# Patient Record
Sex: MALE | Race: WHITE | NOT HISPANIC OR LATINO | Employment: OTHER | URBAN - METROPOLITAN AREA
[De-identification: names, ages, dates, MRNs, and addresses within clinical notes are randomized per-mention and may not be internally consistent; named-entity substitution may affect disease eponyms.]

---

## 2023-10-02 ENCOUNTER — CLINICAL SUPPORT (OUTPATIENT)
Dept: CARDIAC REHAB | Facility: HOSPITAL | Age: 73
End: 2023-10-02
Payer: MEDICARE

## 2023-10-02 DIAGNOSIS — Z00.00 HEALTH CARE MAINTENANCE: ICD-10-CM

## 2023-10-02 PROCEDURE — 9430000001 HC PHASE III CARDIAC REHAB MONTHLY FEE

## 2023-10-03 ENCOUNTER — APPOINTMENT (OUTPATIENT)
Dept: CARDIAC REHAB | Facility: HOSPITAL | Age: 73
End: 2023-10-03
Payer: MEDICARE

## 2023-10-03 NOTE — PROGRESS NOTES
Cardiac Rehabilitation Initial Assessment (iITP)    Name: Garett Stafford  Medical Record Number: [id number]  YOB: 1950    Today’s Date: 10/3/2023  Primary Care Physician: Crys Bright DO  Referring Physician: ***    General  No diagnosis found.    AACVPR Risk Stratification:{AACVPR Risk Stratification:30153}    Medical History  No past medical history on file.  No past surgical history on file.  Allergies: Not on File    Psychosocial Assessment  Patient is returning for a follow up visit after reporting minimal to mild depressive symptoms.     Please document a new PHQ-9 score.    Pt reported/currently experiencing: {Yes/No:66127}  Currently seeing a mental health provider: {Yes/No:57642}  Social Support: {Yes/No:90048}    Learning assessment/barriers: {Assessment/barriers:57876}  Preferred learning method: {Preferred learning method:16019}   Quality of Life Survey:{Quality of Life Survey:94689}    Educational Assessment:  Cardiac Knowledge Test: ***/15    Stages of Change:{Stages of change:40002}    Psychosocial Goals: ***  Psychosocial Interventions/Education: ***        Nutrition Assessment:    Hyperlipidemia: {Yes/No:24577} ***    Lipids: ***  Lipid Draw Date: ***    Current Dietary Guidelines:{Dietary Guidelines:71030}  Barriers to dietary change: {Yes/No:02687} ***    Diet Habit Survey score: ***    Diabetes Assessment    Diabetes:{Yes or No:10382}   Medication: {Medication:40387}  Last Hemoglobin A1C: ***   Last Hemoglobin A1C date: ***  Pt monitors BS at home: {Yes/No:94051}  Frequency: ***  FBS range: ***  Hypoglycemic Episodes: {Yes/No:11883}    Weight Management:    There were no vitals filed for this visit.  There is no height or weight on file to calculate BMI.    Nutrition Goals: ***  Nutrition Interventions/Education: ***    Exercise Assessment:  Current Home Exercise: {Yes/No:54772}  Mode: ***  Days/Week: ***  Min/Day: ***    Exercise Prescription:    Based on: {{Exercise  Prescription:35373}   Frequency:  *** days/week   Mode: {Mode:65762}   Duration: *** total aerobic minutes   Intensity: RPE ***       Target HR ***       METS ***       SpO2 Range *** %       O2 Flow Rate *** L/min     Modality METS Load Duration   1 Pre-Exercise *** *** ***   2 Treadmill *** *** *** ***   3 Nustep 4000 *** *** *** ***   4 Recumbent Cycle *** *** *** ***   5 Weights *** *** *** ***   6 Post-Exercise *** *** ***     Exercise Goals: ***  Exercise Interventions/Education: ***    Other Core Components/Risk Factor Assessment:    Medication adherence:   Current Medications:   No current outpatient medications on file.     No current facility-administered medications for this visit.                 Taking medications as prescribed: {Yes/No:30088}   If no, why: ***   Uses pill box/organizer: {Yes/No:50818}   Carries medication list: {Yes/No:01145}    Blood Pressure Management:  Hx of Hypertension: {Yes/No:98788}  Resting BP: Growth %ile SmartLinks can only be used for patients less than 20 years old.     Heart Failure Management:  Hx of Heart Failure:{Yes/No:34272}  Type (selection): {Type (selection):16018}  Most recent EF: @LVEF%@  Date:***  Onset of heart failure diagnosis: ***  Last heart failure hospitalization: ***  Number of HF admissions per year: ***  Symptoms:{Symptoms:84207}  Is there a family Hx of HF:{Yes/No:86214}  Does patient obtain daily weight {Yes/No:73245}    Smoking/Tobacco Assessment:    Tobacco Use: Not on file       Other Core Component Goals: ***  Other Core Component Interventions/Education: ***       Individual Patient Goals:  ***      Rehaab Staff Signature: Glenys Ledesma RN  Electronic MD Review Signature: ***

## 2023-10-04 ENCOUNTER — APPOINTMENT (OUTPATIENT)
Dept: CARDIAC REHAB | Facility: HOSPITAL | Age: 73
End: 2023-10-04

## 2023-10-04 PROCEDURE — 9430000001 HC PHASE III CARDIAC REHAB MONTHLY FEE

## 2023-10-10 ENCOUNTER — APPOINTMENT (OUTPATIENT)
Dept: CARDIAC REHAB | Facility: HOSPITAL | Age: 73
End: 2023-10-10

## 2023-10-10 PROCEDURE — 9430000001 HC PHASE III CARDIAC REHAB MONTHLY FEE

## 2023-10-11 ENCOUNTER — CLINICAL SUPPORT (OUTPATIENT)
Dept: CARDIAC REHAB | Facility: HOSPITAL | Age: 73
End: 2023-10-11
Payer: COMMERCIAL

## 2023-10-11 PROCEDURE — 9430000001 HC PHASE III CARDIAC REHAB MONTHLY FEE

## 2023-10-12 ENCOUNTER — APPOINTMENT (OUTPATIENT)
Dept: CARDIAC REHAB | Facility: HOSPITAL | Age: 73
End: 2023-10-12
Payer: MEDICARE

## 2023-10-12 PROCEDURE — 9430000001 HC PHASE III CARDIAC REHAB MONTHLY FEE

## 2023-10-12 NOTE — PROGRESS NOTES
Cardiac Rehabilitation Initial Assessment (iITP)    Name: Garett Stafford  Medical Record Number: [id number]  YOB: 1950    Today’s Date: 10/12/2023  Primary Care Physician: Crys Bright DO  Referring Physician: ***    General  No diagnosis found.    AACVPR Risk Stratification:{AACVPR Risk Stratification:62126}    Medical History  No past medical history on file.  No past surgical history on file.  Allergies: Not on File    Psychosocial Assessment  Patient is returning for a follow up visit after reporting minimal to mild depressive symptoms.     Please document a new PHQ-9 score.    Pt reported/currently experiencing: {Yes/No:48543}  Currently seeing a mental health provider: {Yes/No:60094}  Social Support: {Yes/No:22564}    Learning assessment/barriers: {Assessment/barriers:25657}  Preferred learning method: {Preferred learning method:19456}   Quality of Life Survey:{Quality of Life Survey:72043}    Educational Assessment:  Cardiac Knowledge Test: ***/15    Stages of Change:{Stages of change:10189}    Psychosocial Goals: ***  Psychosocial Interventions/Education: ***        Nutrition Assessment:    Hyperlipidemia: {Yes/No:88664} ***    Lipids: ***  Lipid Draw Date: ***    Current Dietary Guidelines:{Dietary Guidelines:29153}  Barriers to dietary change: {Yes/No:69323} ***    Diet Habit Survey score: ***    Diabetes Assessment    Diabetes:{Yes or No:14536}   Medication: {Medication:12001}  Last Hemoglobin A1C: ***   Last Hemoglobin A1C date: ***  Pt monitors BS at home: {Yes/No:23489}  Frequency: ***  FBS range: ***  Hypoglycemic Episodes: {Yes/No:88421}    Weight Management:    There were no vitals filed for this visit.  There is no height or weight on file to calculate BMI.    Nutrition Goals: ***  Nutrition Interventions/Education: ***    Exercise Assessment:  Current Home Exercise: {Yes/No:71256}  Mode: ***  Days/Week: ***  Min/Day: ***    Exercise Prescription:    Based on: {{Exercise  Prescription:97912}   Frequency:  *** days/week   Mode: {Mode:80905}   Duration: *** total aerobic minutes   Intensity: RPE ***       Target HR ***       METS ***       SpO2 Range *** %       O2 Flow Rate *** L/min     Modality METS Load Duration   1 Pre-Exercise *** *** ***   2 Treadmill *** *** *** ***   3 Nustep 4000 *** *** *** ***   4 Recumbent Cycle *** *** *** ***   5 Weights *** *** *** ***   6 Post-Exercise *** *** ***     Exercise Goals: ***  Exercise Interventions/Education: ***    Other Core Components/Risk Factor Assessment:    Medication adherence:   Current Medications:   No current outpatient medications on file.     No current facility-administered medications for this visit.                 Taking medications as prescribed: {Yes/No:35166}   If no, why: ***   Uses pill box/organizer: {Yes/No:58259}   Carries medication list: {Yes/No:58263}    Blood Pressure Management:  Hx of Hypertension: {Yes/No:57461}  Resting BP: Growth %ile SmartLinks can only be used for patients less than 20 years old.     Heart Failure Management:  Hx of Heart Failure:{Yes/No:09867}  Type (selection): {Type (selection):06888}  Most recent EF: @LVEF%@  Date:***  Onset of heart failure diagnosis: ***  Last heart failure hospitalization: ***  Number of HF admissions per year: ***  Symptoms:{Symptoms:95234}  Is there a family Hx of HF:{Yes/No:41862}  Does patient obtain daily weight {Yes/No:42781}    Smoking/Tobacco Assessment:    Tobacco Use: Not on file       Other Core Component Goals: ***  Other Core Component Interventions/Education: ***       Individual Patient Goals:  ***      Rehaab Staff Signature: Jodie Gómez RN  Electronic MD Review Signature: ***

## 2023-11-02 ENCOUNTER — APPOINTMENT (OUTPATIENT)
Dept: CARDIAC REHAB | Facility: HOSPITAL | Age: 73
End: 2023-11-02

## 2023-11-02 DIAGNOSIS — I25.10 CAD IN NATIVE ARTERY: ICD-10-CM

## 2023-11-02 PROCEDURE — 9430000001 HC PHASE III CARDIAC REHAB MONTHLY FEE

## 2023-12-04 ENCOUNTER — APPOINTMENT (OUTPATIENT)
Dept: CARDIAC REHAB | Facility: HOSPITAL | Age: 73
End: 2023-12-04

## 2023-12-04 PROCEDURE — 9430000001 HC PHASE III CARDIAC REHAB MONTHLY FEE

## 2024-01-02 ENCOUNTER — APPOINTMENT (OUTPATIENT)
Dept: CARDIAC REHAB | Facility: HOSPITAL | Age: 74
End: 2024-01-02

## 2024-01-02 PROCEDURE — 9430000001 HC PHASE III CARDIAC REHAB MONTHLY FEE

## 2024-02-01 ENCOUNTER — APPOINTMENT (OUTPATIENT)
Dept: CARDIAC REHAB | Facility: HOSPITAL | Age: 74
End: 2024-02-01

## 2024-02-01 PROCEDURE — 9430000001 HC PHASE III CARDIAC REHAB MONTHLY FEE

## 2024-02-15 ENCOUNTER — APPOINTMENT (OUTPATIENT)
Dept: CARDIAC REHAB | Facility: HOSPITAL | Age: 74
End: 2024-02-15

## 2024-02-15 PROCEDURE — 9430000001 HC PHASE III CARDIAC REHAB MONTHLY FEE

## 2024-03-05 ENCOUNTER — APPOINTMENT (OUTPATIENT)
Dept: CARDIAC REHAB | Facility: HOSPITAL | Age: 74
End: 2024-03-05

## 2024-03-05 PROCEDURE — 9430000001 HC PHASE III CARDIAC REHAB MONTHLY FEE

## 2024-04-02 ENCOUNTER — APPOINTMENT (OUTPATIENT)
Dept: CARDIAC REHAB | Facility: HOSPITAL | Age: 74
End: 2024-04-02

## 2024-04-02 PROCEDURE — 9430000001 HC PHASE III CARDIAC REHAB MONTHLY FEE

## 2024-04-19 ENCOUNTER — APPOINTMENT (OUTPATIENT)
Dept: RADIOLOGY | Facility: HOSPITAL | Age: 74
End: 2024-04-19
Payer: OTHER GOVERNMENT

## 2024-04-19 ENCOUNTER — HOSPITAL ENCOUNTER (EMERGENCY)
Facility: HOSPITAL | Age: 74
Discharge: HOME | End: 2024-04-19
Attending: EMERGENCY MEDICINE
Payer: OTHER GOVERNMENT

## 2024-04-19 VITALS
SYSTOLIC BLOOD PRESSURE: 135 MMHG | TEMPERATURE: 98 F | BODY MASS INDEX: 36.51 KG/M2 | RESPIRATION RATE: 18 BRPM | DIASTOLIC BLOOD PRESSURE: 86 MMHG | HEIGHT: 70 IN | HEART RATE: 60 BPM | OXYGEN SATURATION: 97 % | WEIGHT: 255 LBS

## 2024-04-19 DIAGNOSIS — I86.1 LEFT VARICOCELE: ICD-10-CM

## 2024-04-19 DIAGNOSIS — M54.32 SCIATICA OF LEFT SIDE: Primary | ICD-10-CM

## 2024-04-19 LAB
ALBUMIN SERPL BCP-MCNC: 4.2 G/DL (ref 3.4–5)
ALP SERPL-CCNC: 64 U/L (ref 33–136)
ALT SERPL W P-5'-P-CCNC: 12 U/L (ref 10–52)
ANION GAP SERPL CALC-SCNC: 12 MMOL/L (ref 10–20)
APPEARANCE UR: ABNORMAL
AST SERPL W P-5'-P-CCNC: 17 U/L (ref 9–39)
BASOPHILS # BLD AUTO: 0.02 X10*3/UL (ref 0–0.1)
BASOPHILS NFR BLD AUTO: 0.3 %
BILIRUB SERPL-MCNC: 0.3 MG/DL (ref 0–1.2)
BILIRUB UR STRIP.AUTO-MCNC: NEGATIVE MG/DL
BUN SERPL-MCNC: 21 MG/DL (ref 6–23)
CALCIUM SERPL-MCNC: 9.6 MG/DL (ref 8.6–10.3)
CHLORIDE SERPL-SCNC: 102 MMOL/L (ref 98–107)
CO2 SERPL-SCNC: 30 MMOL/L (ref 21–32)
COLOR UR: YELLOW
CREAT SERPL-MCNC: 1.12 MG/DL (ref 0.5–1.3)
EGFRCR SERPLBLD CKD-EPI 2021: 68 ML/MIN/1.73M*2
EOSINOPHIL # BLD AUTO: 0.14 X10*3/UL (ref 0–0.4)
EOSINOPHIL NFR BLD AUTO: 2.3 %
ERYTHROCYTE [DISTWIDTH] IN BLOOD BY AUTOMATED COUNT: 13.2 % (ref 11.5–14.5)
GLUCOSE SERPL-MCNC: 93 MG/DL (ref 74–99)
GLUCOSE UR STRIP.AUTO-MCNC: NEGATIVE MG/DL
HCT VFR BLD AUTO: 41.7 % (ref 41–52)
HGB BLD-MCNC: 14.3 G/DL (ref 13.5–17.5)
IMM GRANULOCYTES # BLD AUTO: 0.01 X10*3/UL (ref 0–0.5)
IMM GRANULOCYTES NFR BLD AUTO: 0.2 % (ref 0–0.9)
KETONES UR STRIP.AUTO-MCNC: NEGATIVE MG/DL
LACTATE SERPL-SCNC: 0.8 MMOL/L (ref 0.4–2)
LEUKOCYTE ESTERASE UR QL STRIP.AUTO: NEGATIVE
LYMPHOCYTES # BLD AUTO: 2.1 X10*3/UL (ref 0.8–3)
LYMPHOCYTES NFR BLD AUTO: 34.2 %
MCH RBC QN AUTO: 32.6 PG (ref 26–34)
MCHC RBC AUTO-ENTMCNC: 34.3 G/DL (ref 32–36)
MCV RBC AUTO: 95 FL (ref 80–100)
MONOCYTES # BLD AUTO: 0.6 X10*3/UL (ref 0.05–0.8)
MONOCYTES NFR BLD AUTO: 9.8 %
NEUTROPHILS # BLD AUTO: 3.27 X10*3/UL (ref 1.6–5.5)
NEUTROPHILS NFR BLD AUTO: 53.2 %
NITRITE UR QL STRIP.AUTO: NEGATIVE
NRBC BLD-RTO: 0 /100 WBCS (ref 0–0)
PH UR STRIP.AUTO: 6 [PH]
PLATELET # BLD AUTO: 174 X10*3/UL (ref 150–450)
POTASSIUM SERPL-SCNC: 4.1 MMOL/L (ref 3.5–5.3)
PROT SERPL-MCNC: 7 G/DL (ref 6.4–8.2)
PROT UR STRIP.AUTO-MCNC: NEGATIVE MG/DL
RBC # BLD AUTO: 4.38 X10*6/UL (ref 4.5–5.9)
RBC # UR STRIP.AUTO: NEGATIVE /UL
SODIUM SERPL-SCNC: 140 MMOL/L (ref 136–145)
SP GR UR STRIP.AUTO: 1.02
UROBILINOGEN UR STRIP.AUTO-MCNC: <2 MG/DL
WBC # BLD AUTO: 6.1 X10*3/UL (ref 4.4–11.3)

## 2024-04-19 PROCEDURE — 81003 URINALYSIS AUTO W/O SCOPE: CPT | Performed by: PHYSICIAN ASSISTANT

## 2024-04-19 PROCEDURE — 71275 CT ANGIOGRAPHY CHEST: CPT | Performed by: STUDENT IN AN ORGANIZED HEALTH CARE EDUCATION/TRAINING PROGRAM

## 2024-04-19 PROCEDURE — 71275 CT ANGIOGRAPHY CHEST: CPT

## 2024-04-19 PROCEDURE — 72131 CT LUMBAR SPINE W/O DYE: CPT | Mod: RSC

## 2024-04-19 PROCEDURE — 85025 COMPLETE CBC W/AUTO DIFF WBC: CPT | Performed by: PHYSICIAN ASSISTANT

## 2024-04-19 PROCEDURE — 76870 US EXAM SCROTUM: CPT | Mod: FOREIGN READ | Performed by: RADIOLOGY

## 2024-04-19 PROCEDURE — 2500000001 HC RX 250 WO HCPCS SELF ADMINISTERED DRUGS (ALT 637 FOR MEDICARE OP): Performed by: PHYSICIAN ASSISTANT

## 2024-04-19 PROCEDURE — 2550000001 HC RX 255 CONTRASTS: Performed by: EMERGENCY MEDICINE

## 2024-04-19 PROCEDURE — 83605 ASSAY OF LACTIC ACID: CPT | Performed by: PHYSICIAN ASSISTANT

## 2024-04-19 PROCEDURE — 72131 CT LUMBAR SPINE W/O DYE: CPT | Mod: RSC | Performed by: RADIOLOGY

## 2024-04-19 PROCEDURE — 99285 EMERGENCY DEPT VISIT HI MDM: CPT | Mod: 25

## 2024-04-19 PROCEDURE — 36415 COLL VENOUS BLD VENIPUNCTURE: CPT | Performed by: PHYSICIAN ASSISTANT

## 2024-04-19 PROCEDURE — 80053 COMPREHEN METABOLIC PANEL: CPT | Performed by: PHYSICIAN ASSISTANT

## 2024-04-19 PROCEDURE — 74174 CTA ABD&PLVS W/CONTRAST: CPT | Performed by: STUDENT IN AN ORGANIZED HEALTH CARE EDUCATION/TRAINING PROGRAM

## 2024-04-19 PROCEDURE — 76870 US EXAM SCROTUM: CPT

## 2024-04-19 RX ORDER — ACETAMINOPHEN AND CODEINE PHOSPHATE 300; 30 MG/1; MG/1
1 TABLET ORAL ONCE
Status: COMPLETED | OUTPATIENT
Start: 2024-04-19 | End: 2024-04-19

## 2024-04-19 RX ORDER — ACETAMINOPHEN 500 MG
1000 TABLET ORAL EVERY 6 HOURS PRN
Qty: 56 TABLET | Refills: 0 | Status: SHIPPED | OUTPATIENT
Start: 2024-04-19 | End: 2024-04-26

## 2024-04-19 RX ORDER — METHOCARBAMOL 500 MG/1
500 TABLET, FILM COATED ORAL 2 TIMES DAILY
Qty: 20 TABLET | Refills: 0 | Status: SHIPPED | OUTPATIENT
Start: 2024-04-19 | End: 2024-04-29

## 2024-04-19 RX ORDER — LIDOCAINE 50 MG/G
1 PATCH TOPICAL DAILY
Qty: 10 PATCH | Refills: 0 | Status: SHIPPED | OUTPATIENT
Start: 2024-04-19 | End: 2024-04-29

## 2024-04-19 RX ADMIN — ACETAMINOPHEN AND CODEINE PHOSPHATE 1 TABLET: 300; 30 TABLET ORAL at 15:54

## 2024-04-19 RX ADMIN — IOHEXOL 100 ML: 350 INJECTION, SOLUTION INTRAVENOUS at 18:52

## 2024-04-19 ASSESSMENT — PAIN - FUNCTIONAL ASSESSMENT
PAIN_FUNCTIONAL_ASSESSMENT: 0-10
PAIN_FUNCTIONAL_ASSESSMENT: 0-10

## 2024-04-19 ASSESSMENT — PAIN SCALES - GENERAL
PAINLEVEL_OUTOF10: 8
PAINLEVEL_OUTOF10: 5 - MODERATE PAIN
PAINLEVEL_OUTOF10: 5 - MODERATE PAIN
PAINLEVEL_OUTOF10: 6

## 2024-04-19 ASSESSMENT — COLUMBIA-SUICIDE SEVERITY RATING SCALE - C-SSRS
6. HAVE YOU EVER DONE ANYTHING, STARTED TO DO ANYTHING, OR PREPARED TO DO ANYTHING TO END YOUR LIFE?: NO
1. IN THE PAST MONTH, HAVE YOU WISHED YOU WERE DEAD OR WISHED YOU COULD GO TO SLEEP AND NOT WAKE UP?: NO
2. HAVE YOU ACTUALLY HAD ANY THOUGHTS OF KILLING YOURSELF?: NO

## 2024-04-19 ASSESSMENT — PAIN DESCRIPTION - ORIENTATION: ORIENTATION: LOWER;LEFT

## 2024-04-19 ASSESSMENT — PAIN DESCRIPTION - FREQUENCY: FREQUENCY: CONSTANT/CONTINUOUS

## 2024-04-19 ASSESSMENT — PAIN DESCRIPTION - DESCRIPTORS: DESCRIPTORS: SHARP

## 2024-04-19 ASSESSMENT — PAIN DESCRIPTION - DIRECTION: RADIATING_TOWARDS: LEFT TESTICLE

## 2024-04-19 ASSESSMENT — PAIN DESCRIPTION - LOCATION: LOCATION: BACK

## 2024-04-19 ASSESSMENT — PAIN DESCRIPTION - PAIN TYPE: TYPE: ACUTE PAIN

## 2024-04-19 NOTE — ED PROVIDER NOTES
EMERGENCY MEDICINE EVALUATION NOTE    History of Present Illness     Chief Complaint:   Chief Complaint   Patient presents with    Back Pain     Pt reports lower back pain that radiates towards his left testicle. Pt reports the pain started yesterday morning and has progressed    Groin Pain       HPI: Garett Stafford is a 74 y.o. male presents with a chief complaint of multiple medical complaints.  Patient reports that his pain in his left lower back radiates to the left testicle.  Patient states that he is not sure that radiates directly to the testicle or if both just hurt.  Patient reports that he has no known injury to the area.  He states that he has no urinary symptoms other than slight frequency but denies any dysuria.  Patient says he believes he might of strained his back but with the pain in his testicle concerned him.  He denies any loss of bowel or bladder function.  Patient denies any weakness or numbness to the extremities but states that movement does cause the pain worse in his lower back.  Patient reports that he was concerned because he felt like there was a bulge in his scrotal area.  Patient denies use of any anticoagulation.  Patient denies any fevers or chills.  Towards the end of the interview on the way into the room patient did mention that he has a history of an aneurysm in his aorta when he believes it is lower abdomen.  Patient is unsure of last evaluation.     Previous History   No past medical history on file.  No past surgical history on file.     No family history on file.  Allergies   Allergen Reactions    Colestipol Other     Unable to move left arm    Demerol [Meperidine] Other     tachycardia    Lipitor [Atorvastatin] Other     Unable  to use left arm    Simvastatin Other     Unable to use left arm    Augmentin [Amoxicillin-Pot Clavulanate] Itching and Rash    Adhes. Band-Tape-Benzalkonium Other     Causes redness      Current Outpatient Medications   Medication Instructions     acetaminophen (TYLENOL) 1,000 mg, oral, Every 6 hours PRN    lidocaine (Lidoderm) 5 % patch 1 patch, transdermal, Daily, Remove & discard patch within 12 hours or as directed by MD.    methocarbamol (ROBAXIN) 500 mg, oral, 2 times daily       Physical Exam     Appearance: Alert, oriented , cooperative     Skin: Intact,  dry skin, no lesions, rash, petechiae or purpura.      Eyes: PERRLA, EOMs intact,  Conjunctiva pink      ENT: Hearing grossly intact. Pharynx clear     Neck: Supple. Trachea at midline.      Pulmonary: Clear bilaterally. No rales, rhonchi or wheezing. No accessory muscle use or stridor.     Cardiac: Normal rate and rhythm without murmur     Abdomen: Soft, nontender, active bowel sounds.    : No obvious abnormal  exam.  Patient has no obvious bulge palpated in left scrotal area.  No tenderness over the left testicle.  Patient has no inguinal hernia palpated.     Musculoskeletal: Full range of motion.  No midline C, T, L-spine tenderness.  Diffuse left-sided lumbar paraspinal tenderness.  Neuro vas intact in bilateral lower extremities.  Equal questionable bilateral lower extremities.     Neurological:Cranial nerves II through XII are grossly intact, normal sensation, no weakness, no focal findings identified.     Results     Labs Reviewed   CBC WITH AUTO DIFFERENTIAL - Abnormal       Result Value    WBC 6.1      nRBC 0.0      RBC 4.38 (*)     Hemoglobin 14.3      Hematocrit 41.7      MCV 95      MCH 32.6      MCHC 34.3      RDW 13.2      Platelets 174      Neutrophils % 53.2      Immature Granulocytes %, Automated 0.2      Lymphocytes % 34.2      Monocytes % 9.8      Eosinophils % 2.3      Basophils % 0.3      Neutrophils Absolute 3.27      Immature Granulocytes Absolute, Automated 0.01      Lymphocytes Absolute 2.10      Monocytes Absolute 0.60      Eosinophils Absolute 0.14      Basophils Absolute 0.02     URINALYSIS WITH REFLEX CULTURE AND MICROSCOPIC - Abnormal    Color, Urine Yellow       Appearance, Urine Hazy (*)     Specific Gravity, Urine 1.016      pH, Urine 6.0      Protein, Urine NEGATIVE      Glucose, Urine NEGATIVE      Blood, Urine NEGATIVE      Ketones, Urine NEGATIVE      Bilirubin, Urine NEGATIVE      Urobilinogen, Urine <2.0      Nitrite, Urine NEGATIVE      Leukocyte Esterase, Urine NEGATIVE     COMPREHENSIVE METABOLIC PANEL - Normal    Glucose 93      Sodium 140      Potassium 4.1      Chloride 102      Bicarbonate 30      Anion Gap 12      Urea Nitrogen 21      Creatinine 1.12      eGFR 68      Calcium 9.6      Albumin 4.2      Alkaline Phosphatase 64      Total Protein 7.0      AST 17      Bilirubin, Total 0.3      ALT 12     LACTATE - Normal    Lactate 0.8      Narrative:     Venipuncture immediately after or during the administration of Metamizole may lead to falsely low results. Testing should be performed immediately  prior to Metamizole dosing.   URINALYSIS WITH REFLEX CULTURE AND MICROSCOPIC    Narrative:     The following orders were created for panel order Urinalysis with Reflex Culture and Microscopic.  Procedure                               Abnormality         Status                     ---------                               -----------         ------                     Urinalysis with Reflex C...[[id number]]  Abnormal            Final result               Extra Urine Gray Tube[[id number]]                            In process                   Please view results for these tests on the individual orders.   EXTRA URINE GRAY TUBE     CT angio chest abdomen pelvis   Final Result   1. Stable 4.8 x 4.2 cm infrarenal abdominal aortic aneurysm with   slight interval increase in the degree of noncalcified mural thrombus   about the aneurysm. No evidence of dissection. Mild-to-moderate   diffuse atherosclerotic calcification.   2. No acute abnormality of the chest, abdomen or pelvis.   3. Other findings as described above.                  Signed by: Stevan Pena 4/19/2024  "7:26 PM   Dictation workstation:   [id number]      CT lumbar spine wo IV contrast   Final Result   1.Multilevel degenerative disc and degenerative facet disease   with multilevel spondylosis.  Progressive displaced disc disease most   marked at L2-3.   2.Fusiform aneurysm of the infrarenal aorta measuring 5.1 x 4.4   cm, previously 4.8 x 4.2 cm., recommend consideration of vascular   consultation.   Signed by Nancy Henry DO      US scrotum   Final Result   Left-sided varicocele measuring 0.3 cm. Bilateral hydroceles with   debris, greater on the left.   Normal testicular spectral Doppler evaluation.   No significant change compared to prior ultrasound from September 2017.   Signed by Reymundo Vazquez MD            ED Course & Medical Decision Making     Medications   acetaminophen-codeine (Tylenol w/ Codeine #3) 300-30 mg per tablet 1 tablet (1 tablet oral Given 4/19/24 1554)   iohexol (OMNIPaque) 350 mg iodine/mL solution 100 mL (100 mL intravenous Given 4/19/24 1852)     Heart Rate:  [60-62]   Temperature:  [36.7 °C (98 °F)-36.7 °C (98.1 °F)]   Respirations:  [18]   BP: (120-136)/(78-86)   Height:  [177.8 cm (5' 10\")]   Weight:  [116 kg (255 lb)]   Pulse Ox:  [97 %]    Diagnoses as of 04/20/24 0041   Sciatica of left side   Left varicocele     74-year male presents today with a multitude of complaints that involve the left lower back and left groin pain.  Patient reported he had felt an abnormality in his left testicular area but also pain rating to the left flank to that area.  He states that he had some urinary frequency but no dysuria.  Patient denies any associated chest pain or shortness of breath.  Patient does report that he also has a history of an aneurysm in his lower aorta.  Patient did have a workup which included blood work which did not show any significant abnormality.  Patient had CT angio of the chest abdomen pelvis which showed persistent aneurysm but no significant dilatation.  Patient had " ultrasound of the testicles which did show a left-sided varicocele.  CT of the spine did show bulging disks consistent with sciatica and pain of left lower extremity.  At this time patient had no loss of bowel or bladder function and no other red flag signs or symptoms of be concerning for cauda equina so I do not think patient needs MRI for further evaluation.  Patient is in agreement with this plan of care.  Patient updated on results by attending physician and will be discharged home at this time.    Procedures   Procedures    Diagnosis     1. Sciatica of left side    2. Left varicocele        Disposition   Discharged    ED Prescriptions       Medication Sig Dispense Start Date End Date Auth. Provider    methocarbamol (Robaxin) 500 mg tablet Take 1 tablet (500 mg) by mouth 2 times a day for 10 days. 20 tablet 4/19/2024 4/29/2024 Bj Ronquillo MD    acetaminophen (Tylenol) 500 mg tablet Take 2 tablets (1,000 mg) by mouth every 6 hours if needed for moderate pain (4 - 6) for up to 7 days. 56 tablet 4/19/2024 4/26/2024 Bj Ronquillo MD    lidocaine (Lidoderm) 5 % patch Place 1 patch over 12 hours on the skin once daily for 10 days. Remove & discard patch within 12 hours or as directed by MD. 10 patch 4/19/2024 4/29/2024 Bj Ronquillo MD            Disclaimer: This note was dictated by speech recognition. Minor errors in transcription may be present. Please call if questions.       Gage Sesay PA-C  04/20/24 0043

## 2024-04-20 LAB — HOLD SPECIMEN: 293

## 2024-05-08 ENCOUNTER — APPOINTMENT (OUTPATIENT)
Dept: RADIOLOGY | Facility: HOSPITAL | Age: 74
End: 2024-05-08
Payer: OTHER GOVERNMENT

## 2024-05-08 ENCOUNTER — HOSPITAL ENCOUNTER (EMERGENCY)
Facility: HOSPITAL | Age: 74
Discharge: HOME | End: 2024-05-08
Payer: OTHER GOVERNMENT

## 2024-05-08 VITALS
WEIGHT: 255 LBS | HEART RATE: 67 BPM | TEMPERATURE: 97.9 F | DIASTOLIC BLOOD PRESSURE: 79 MMHG | OXYGEN SATURATION: 96 % | BODY MASS INDEX: 36.51 KG/M2 | HEIGHT: 70 IN | RESPIRATION RATE: 18 BRPM | SYSTOLIC BLOOD PRESSURE: 135 MMHG
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
## 2024-05-08 DIAGNOSIS — S80.861A INSECT BITE OF RIGHT LOWER LEG, INITIAL ENCOUNTER: Primary | ICD-10-CM

## 2024-05-08 DIAGNOSIS — L81.9 DISCOLORATION OF SKIN OF MULTIPLE SITES OF LOWER EXTREMITY: ICD-10-CM

## 2024-05-08 LAB
ANION GAP SERPL CALC-SCNC: 15 MMOL/L (ref 10–20)
APTT PPP: 22 SECONDS (ref 27–38)
BASOPHILS # BLD AUTO: 0.02 X10*3/UL (ref 0–0.1)
BASOPHILS NFR BLD AUTO: 0.3 %
BUN SERPL-MCNC: 25 MG/DL (ref 6–23)
CALCIUM SERPL-MCNC: 9.9 MG/DL (ref 8.6–10.3)
CHLORIDE SERPL-SCNC: 105 MMOL/L (ref 98–107)
CO2 SERPL-SCNC: 23 MMOL/L (ref 21–32)
CREAT SERPL-MCNC: 1.15 MG/DL (ref 0.5–1.3)
EGFRCR SERPLBLD CKD-EPI 2021: 66 ML/MIN/1.73M*2
EOSINOPHIL # BLD AUTO: 0.14 X10*3/UL (ref 0–0.4)
EOSINOPHIL NFR BLD AUTO: 2.2 %
ERYTHROCYTE [DISTWIDTH] IN BLOOD BY AUTOMATED COUNT: 13.2 % (ref 11.5–14.5)
GLUCOSE SERPL-MCNC: 92 MG/DL (ref 74–99)
HCT VFR BLD AUTO: 44.2 % (ref 41–52)
HGB BLD-MCNC: 15 G/DL (ref 13.5–17.5)
IMM GRANULOCYTES # BLD AUTO: 0.02 X10*3/UL (ref 0–0.5)
IMM GRANULOCYTES NFR BLD AUTO: 0.3 % (ref 0–0.9)
INR PPP: 1 (ref 0.9–1.1)
LYMPHOCYTES # BLD AUTO: 2.29 X10*3/UL (ref 0.8–3)
LYMPHOCYTES NFR BLD AUTO: 36.2 %
MCH RBC QN AUTO: 32 PG (ref 26–34)
MCHC RBC AUTO-ENTMCNC: 33.9 G/DL (ref 32–36)
MCV RBC AUTO: 94 FL (ref 80–100)
MONOCYTES # BLD AUTO: 0.57 X10*3/UL (ref 0.05–0.8)
MONOCYTES NFR BLD AUTO: 9 %
NEUTROPHILS # BLD AUTO: 3.28 X10*3/UL (ref 1.6–5.5)
NEUTROPHILS NFR BLD AUTO: 52 %
NRBC BLD-RTO: 0 /100 WBCS (ref 0–0)
PLATELET # BLD AUTO: 161 X10*3/UL (ref 150–450)
POTASSIUM SERPL-SCNC: 4.2 MMOL/L (ref 3.5–5.3)
PROTHROMBIN TIME: 11.3 SECONDS (ref 9.8–12.8)
RBC # BLD AUTO: 4.69 X10*6/UL (ref 4.5–5.9)
SODIUM SERPL-SCNC: 139 MMOL/L (ref 136–145)
WBC # BLD AUTO: 6.3 X10*3/UL (ref 4.4–11.3)

## 2024-05-08 PROCEDURE — 73564 X-RAY EXAM KNEE 4 OR MORE: CPT | Mod: RT

## 2024-05-08 PROCEDURE — 85730 THROMBOPLASTIN TIME PARTIAL: CPT | Performed by: NURSE PRACTITIONER

## 2024-05-08 PROCEDURE — 80048 BASIC METABOLIC PNL TOTAL CA: CPT | Performed by: NURSE PRACTITIONER

## 2024-05-08 PROCEDURE — 85610 PROTHROMBIN TIME: CPT | Performed by: NURSE PRACTITIONER

## 2024-05-08 PROCEDURE — 99283 EMERGENCY DEPT VISIT LOW MDM: CPT | Performed by: NURSE PRACTITIONER

## 2024-05-08 PROCEDURE — 36415 COLL VENOUS BLD VENIPUNCTURE: CPT | Performed by: NURSE PRACTITIONER

## 2024-05-08 PROCEDURE — 73564 X-RAY EXAM KNEE 4 OR MORE: CPT | Mod: RIGHT SIDE | Performed by: RADIOLOGY

## 2024-05-08 PROCEDURE — 85025 COMPLETE CBC W/AUTO DIFF WBC: CPT | Performed by: NURSE PRACTITIONER

## 2024-05-08 ASSESSMENT — LIFESTYLE VARIABLES
TOTAL SCORE: 0
HAVE YOU EVER FELT YOU SHOULD CUT DOWN ON YOUR DRINKING: NO
EVER FELT BAD OR GUILTY ABOUT YOUR DRINKING: NO
HAVE PEOPLE ANNOYED YOU BY CRITICIZING YOUR DRINKING: NO
EVER HAD A DRINK FIRST THING IN THE MORNING TO STEADY YOUR NERVES TO GET RID OF A HANGOVER: NO

## 2024-05-08 ASSESSMENT — PAIN SCALES - GENERAL: PAINLEVEL_OUTOF10: 0 - NO PAIN

## 2024-05-08 ASSESSMENT — VISUAL ACUITY: OU: 1

## 2024-05-08 ASSESSMENT — PAIN - FUNCTIONAL ASSESSMENT: PAIN_FUNCTIONAL_ASSESSMENT: 0-10

## 2024-05-08 NOTE — ED PROVIDER NOTES
HPI   Chief Complaint   Patient presents with    bruise to right knee area     bruise to right knee area. Says he did not hit his leg at all. Denies any recent falls. Is only on baby ASA daily.        Patient presents to the emergency department for evaluation of a bruise to the right knee area that he woke up with a day or so ago.  Patient states that there was some numbness to the area so he put body cream on it and that numbness improved.  He admits that he has had numbness to this area since after having a knee arthroplasty 2 years ago at [facility].  He does not have any known traumatic fall, bumping of the leg or knee pain or leg pain.  He was recently evaluated for sciatic, within the last month and he reports that pain is resolved with the use of Lidoderm patch.  He noticed today a bite near his ankle that he scratched due to being itchy and he has now a similar bruise on his right lower leg beneath the area of bruising by his knee.  He denies any fever, chills, myalgias or malaise.  There is no associated limited range of motion secondary to this.  He denies a history of blood clots, posterior leg pain or swelling.  He does not recall any kind of a insect bite or sting.  There is no associated chest pain, shortness of breath, hemoptysis, nausea, vomiting or abdominal pain.  He is having bowel movements and urinating as per typical without loss of bowel or bladder control or saddle paresthesia.  He is able to Perform ADLs and ambulate without difficulty. He has not taken any other over-the-counter intervention for his symptoms.  He reports some to be mild in severity and persistent nature.      History provided by:  Patient   used: No      INFORMED PHOTO CONSENT:    The patient has given verbal consent to have photos taken of right leg and inserted into their provider note as a part of their permanent medical record for purposes of documentation, treatment management, and/or medical  "review. All images taken were transmitted and stored on a secure Epic  Site located within a Media Folder Tab by a registered Epic-Haiku Mobile Application Device. See \"Media\" tab in Epic or photo as below.                    Javed Coma Scale Score: 15                     Patient History   History reviewed. No pertinent past medical history.  History reviewed. No pertinent surgical history.  No family history on file.  Social History     Tobacco Use    Smoking status: Not on file    Smokeless tobacco: Not on file   Substance Use Topics    Alcohol use: Not on file    Drug use: Not on file       Physical Exam   ED Triage Vitals [05/08/24 1307]   Temperature Heart Rate Respirations BP   36.6 °C (97.9 °F) 67 18 135/79      Pulse Ox Temp Source Heart Rate Source Patient Position   96 % Temporal Monitor Sitting      BP Location FiO2 (%)     Left arm --       Physical Exam  Vitals reviewed.   Constitutional:       Appearance: Normal appearance.      Comments: Patient seated in chair reading upon my entering room.   HENT:      Head: Normocephalic and atraumatic.      Right Ear: Hearing normal.      Left Ear: Hearing normal.      Nose: Nose normal.      Mouth/Throat:      Lips: Pink.      Mouth: Mucous membranes are moist.   Eyes:      General: Vision grossly intact.   Cardiovascular:      Rate and Rhythm: Normal rate and regular rhythm.      Pulses:           Dorsalis pedis pulses are 2+ on the right side and 2+ on the left side.        Posterior tibial pulses are 2+ on the right side and 2+ on the left side.      Comments: Pulses confirmed by doppler for patient to hear as well.  The right calf measures 48 cm and the left calf measures 49 cm.  No posterior leg tenderness bilaterally.  Pulmonary:      Effort: Pulmonary effort is normal.      Breath sounds: Normal breath sounds.   Musculoskeletal:      Cervical back: Normal range of motion and neck supple.      Right lower leg: No edema.      Left lower leg: No " edema.      Comments: There is no bony tenderness to the right hip, thigh, knee, tib-fib, ankle or foot on the right.  Patient does have petechial bruising to the knee and lower aspect of the tib-fib as depicted below.  There is 2 bite marks 1 at the proximal fibula and 1 at the distal tib-fib which are in the center of the skin discoloration as depicted below.  There is no foreign body or fluctuance consistent with abscess.  No lymphangitis or warmth.  Full range of motion of the knee, ankle and toes.  Compartments are all soft.  Neurovascularly intact with normal capillary refill.   Skin:     General: Skin is warm and dry.      Capillary Refill: Capillary refill takes less than 2 seconds.      Comments: See documentation above under musculoskeletal.    Neurological:      Mental Status: He is alert and oriented to person, place, and time.      Sensory: Sensation is intact.      Motor: Motor function is intact.      Coordination: Coordination is intact.      Gait: Gait is intact.      Comments: Ambulatory with a steady gait, no limp or assistive device.         ED Course & MDM   Diagnoses as of 05/08/24 1638   Insect bite of right lower leg, initial encounter   Discoloration of skin of multiple sites of lower extremity       Medical Decision Making  Presents to the emergency department for evaluation of bruising to his knee.  There was some associated numbness to the area however he admits he has had numbness to this area ever since his knee arthroplasty.  He was evaluated for sciatica within the last month however he denies this being connected to his back and his sciatica has resolved with the use of Lidoderm patches.  He does not have any loss of bowel or bladder control, urinary retention, saddle paresthesia, foot drop or leg weakness.  He on exam has central puncture and somewhat raised induration consistent with bite and patient admits that he has a bite distally to the area and a developing bruise consistent  with what he scratched due to itchiness.  This is likely what happened to the area around the knee and he potentially did this in his sleep in hindsight.  Will check his knee arthroplasty for thoroughness and for his peace of mind as well as baseline labs to ensure no thrombocytopenia, leukocytosis or need for further intervention.  Patient declines need for pain medication as he denies pain.    X-rays as interpreted by radiologist show no hardware complication or periprosthetic fracture.  There is trace knee effusion with no erythema or limited range of motion suggestive of septic joint and he has no leukocytosis or fever.  There is no significant electrolyte derangement, thrombocytopenia or prolonged PT/INR.    Plan is for topical steroid cream as needed, monitoring of symptoms and keep his dermatology appointment as scheduled next week.  He declines a prescription for antibiotic for watchful waiting should he develop a cellulitis. Patient is non-toxic, not hypoxic and appropriate for this outpatient management plan which they prefer. Encouragement to arrange close follow up was discussed as well as provided in a written handout of discharge instructions. Patient was educated on signs of symptoms to watch for indicative of re-evaluation in the emergency department setting to include any worsening of current symptoms. They verbalized understanding of instructions and is amenable to this treatment plan with no social determinants of health that would obscure this plan. Patient departed in stable condition.         Procedure  Procedures     JOAQUIN Woodard  05/08/24 1636       ODALIS Woodard-SANTINO  05/08/24 1638

## 2024-05-08 NOTE — DISCHARGE INSTRUCTIONS
"INSECT BITES AND STINGS PATIENT INSTRUCTIONS:    How are insect bites and stings different? -- When an insect bites you, it uses its mouth parts. When an insect stings you, it uses a special \"stinger\" on the back of its body.  Biting insects can transfer blood from other people and animals they've bitten to you. That means they can infect you with the diseases their other victims have. Mosquitoes and ticks, for example, can carry a few infections.    Stinging insects, such as bees, wasps, and fire ants, do not usually carry disease. But stinging insects can inject you with venom that can irritate your skin. Plus, insect stings can be deadly to people who are severely allergic to the insect venom.    What should I do if I am stung by a bee, wasp, or fire ant? -- If you are stung by a bee or wasp, quickly remove the stinger from your skin if it is still there. If you are stung by a fire ant, kill the ant with a slap as soon as you feel the sting.    Some people have a severe allergic reaction to insect stings called anaphylaxis. Call for an ambulance (in the [place] and [place], dial 9-1-1) if you suddenly:  ?Have trouble breathing, become hoarse, or start wheezing (hearing a whistling sound when you breathe)  ?Start to swell, especially around the face, eyelids, ears, mouth, hands, or feet  ?Develop belly cramps, nausea, vomiting, or diarrhea  ?Feel dizzy or pass out  What is a normal reaction to an insect sting? -- Insect stings can cause the area around the sting to swell, turn red, hurt, and feel hot.  To treat the pain and swelling around the area of the sting, you can:  ?Wash the area with soap and cool water  ?Keep the area clean and try not to scratch it  ?Put a cold, damp washcloth on the area  ?Take or apply anti-itch medicine  ?Take a nonprescription pain medicine for the pain    What should I know about tick bites? -- Ticks are found in the grass and on shrubs, and can attach to people walking by. One type of " tick can spread Lyme disease. But a tick has to stay attached for a while before it can give you the infection. If you are bitten by a tick, gently remove the tick from your skin, using tweezers. If you cannot remove a tick, see your doctor or nurse.    What can I do to reduce the chances of getting bitten or stung? -- You can:  ?Wear shoes, long-sleeved shirts, and long pants when you go outside. If you are worried about ticks, tuck your pants into your socks and wear light colors so you can spot any ticks that get on you.  ?Wear bug spray.  ?Stay inside at bhavana and dusk, when mosquitoes are most active.  ?Drain areas of standing water near your home, such as wading pools and buckets. Mosquitoes breed in standing water.  ?Keep foods and drinks covered when you are outside.  ?If you see a stinging insect, stay calm and slowly back away.  ?If you live in an area that has fire ants, avoid stepping on ant mounds.  ?If you find an insect nest in or near your house, call a pest-control service to get rid of the nest safely.    ©2017 [employer] and/or its affiliates. All Rights Reserved.

## 2024-06-04 ENCOUNTER — APPOINTMENT (OUTPATIENT)
Dept: CARDIAC REHAB | Facility: HOSPITAL | Age: 74
End: 2024-06-04

## 2024-06-04 PROCEDURE — 9430000001 HC PHASE III CARDIAC REHAB MONTHLY FEE

## 2024-07-01 ENCOUNTER — CLINICAL SUPPORT (OUTPATIENT)
Dept: CARDIAC REHAB | Facility: HOSPITAL | Age: 74
End: 2024-07-01

## 2024-07-01 PROCEDURE — 9430000001 HC PHASE III CARDIAC REHAB MONTHLY FEE

## 2024-08-01 ENCOUNTER — APPOINTMENT (OUTPATIENT)
Dept: CARDIAC REHAB | Facility: HOSPITAL | Age: 74
End: 2024-08-01

## 2024-08-01 PROCEDURE — 9430000001 HC PHASE III CARDIAC REHAB MONTHLY FEE

## 2024-09-03 ENCOUNTER — APPOINTMENT (OUTPATIENT)
Dept: CARDIAC REHAB | Facility: HOSPITAL | Age: 74
End: 2024-09-03

## 2024-09-03 PROCEDURE — 9430000001 HC PHASE III CARDIAC REHAB MONTHLY FEE

## 2024-10-03 ENCOUNTER — APPOINTMENT (OUTPATIENT)
Dept: CARDIAC REHAB | Facility: HOSPITAL | Age: 74
End: 2024-10-03

## 2024-10-03 PROCEDURE — 9430000001 HC PHASE III CARDIAC REHAB MONTHLY FEE

## 2024-10-30 ENCOUNTER — APPOINTMENT (OUTPATIENT)
Dept: RADIOLOGY | Facility: HOSPITAL | Age: 74
End: 2024-10-30
Payer: OTHER GOVERNMENT

## 2024-10-30 ENCOUNTER — HOSPITAL ENCOUNTER (EMERGENCY)
Facility: HOSPITAL | Age: 74
Discharge: HOME | End: 2024-10-30
Attending: EMERGENCY MEDICINE
Payer: OTHER GOVERNMENT

## 2024-10-30 ENCOUNTER — APPOINTMENT (OUTPATIENT)
Dept: CARDIOLOGY | Facility: HOSPITAL | Age: 74
End: 2024-10-30
Payer: OTHER GOVERNMENT

## 2024-10-30 VITALS
HEART RATE: 60 BPM | TEMPERATURE: 97 F | SYSTOLIC BLOOD PRESSURE: 168 MMHG | HEIGHT: 70 IN | OXYGEN SATURATION: 96 % | RESPIRATION RATE: 16 BRPM | BODY MASS INDEX: 37.51 KG/M2 | WEIGHT: 262 LBS | DIASTOLIC BLOOD PRESSURE: 90 MMHG

## 2024-10-30 DIAGNOSIS — R51.9 PRESSURE IN HEAD: ICD-10-CM

## 2024-10-30 DIAGNOSIS — M79.629 AXILLARY PAIN, UNSPECIFIED LATERALITY: Primary | ICD-10-CM

## 2024-10-30 LAB
ALBUMIN SERPL BCP-MCNC: 4.3 G/DL (ref 3.4–5)
ALP SERPL-CCNC: 66 U/L (ref 33–136)
ALT SERPL W P-5'-P-CCNC: 12 U/L (ref 10–52)
ANION GAP SERPL CALC-SCNC: 11 MMOL/L (ref 10–20)
APPEARANCE UR: CLEAR
AST SERPL W P-5'-P-CCNC: 16 U/L (ref 9–39)
ATRIAL RATE: 60 BPM
BASOPHILS # BLD AUTO: 0.02 X10*3/UL (ref 0–0.1)
BASOPHILS NFR BLD AUTO: 0.3 %
BILIRUB SERPL-MCNC: 0.5 MG/DL (ref 0–1.2)
BILIRUB UR STRIP.AUTO-MCNC: NEGATIVE MG/DL
BNP SERPL-MCNC: 23 PG/ML (ref 0–99)
BUN SERPL-MCNC: 20 MG/DL (ref 6–23)
CALCIUM SERPL-MCNC: 9.4 MG/DL (ref 8.6–10.3)
CARDIAC TROPONIN I PNL SERPL HS: 10 NG/L (ref 0–20)
CARDIAC TROPONIN I PNL SERPL HS: 9 NG/L (ref 0–20)
CHLORIDE SERPL-SCNC: 105 MMOL/L (ref 98–107)
CO2 SERPL-SCNC: 29 MMOL/L (ref 21–32)
COLOR UR: YELLOW
CREAT SERPL-MCNC: 1.23 MG/DL (ref 0.5–1.3)
EGFRCR SERPLBLD CKD-EPI 2021: 61 ML/MIN/1.73M*2
EOSINOPHIL # BLD AUTO: 0.15 X10*3/UL (ref 0–0.4)
EOSINOPHIL NFR BLD AUTO: 2.3 %
ERYTHROCYTE [DISTWIDTH] IN BLOOD BY AUTOMATED COUNT: 13.2 % (ref 11.5–14.5)
GLUCOSE SERPL-MCNC: 106 MG/DL (ref 74–99)
GLUCOSE UR STRIP.AUTO-MCNC: NORMAL MG/DL
HCT VFR BLD AUTO: 43.7 % (ref 41–52)
HGB BLD-MCNC: 14.4 G/DL (ref 13.5–17.5)
HOLD SPECIMEN: NORMAL
IMM GRANULOCYTES # BLD AUTO: 0.02 X10*3/UL (ref 0–0.5)
IMM GRANULOCYTES NFR BLD AUTO: 0.3 % (ref 0–0.9)
KETONES UR STRIP.AUTO-MCNC: NEGATIVE MG/DL
LEUKOCYTE ESTERASE UR QL STRIP.AUTO: NEGATIVE
LYMPHOCYTES # BLD AUTO: 2.21 X10*3/UL (ref 0.8–3)
LYMPHOCYTES NFR BLD AUTO: 33.9 %
MAGNESIUM SERPL-MCNC: 1.99 MG/DL (ref 1.6–2.4)
MCH RBC QN AUTO: 31.7 PG (ref 26–34)
MCHC RBC AUTO-ENTMCNC: 33 G/DL (ref 32–36)
MCV RBC AUTO: 96 FL (ref 80–100)
MONOCYTES # BLD AUTO: 0.58 X10*3/UL (ref 0.05–0.8)
MONOCYTES NFR BLD AUTO: 8.9 %
NEUTROPHILS # BLD AUTO: 3.54 X10*3/UL (ref 1.6–5.5)
NEUTROPHILS NFR BLD AUTO: 54.3 %
NITRITE UR QL STRIP.AUTO: NEGATIVE
NRBC BLD-RTO: 0 /100 WBCS (ref 0–0)
P AXIS: 37 DEGREES
PH UR STRIP.AUTO: 6.5 [PH]
PLATELET # BLD AUTO: 173 X10*3/UL (ref 150–450)
POTASSIUM SERPL-SCNC: 3.9 MMOL/L (ref 3.5–5.3)
PR INTERVAL: 182 MS
PROT SERPL-MCNC: 7 G/DL (ref 6.4–8.2)
PROT UR STRIP.AUTO-MCNC: NEGATIVE MG/DL
Q ONSET: 249 MS
QRS COUNT: 9 BEATS
QRS DURATION: 194 MS
QT INTERVAL: 488 MS
QTC CALCULATION(BAZETT): 488 MS
QTC FREDERICIA: 488 MS
R AXIS: -80 DEGREES
RBC # BLD AUTO: 4.54 X10*6/UL (ref 4.5–5.9)
RBC # UR STRIP.AUTO: NEGATIVE /UL
SODIUM SERPL-SCNC: 141 MMOL/L (ref 136–145)
SP GR UR STRIP.AUTO: 1.02
T AXIS: 61 DEGREES
T OFFSET: 493 MS
UROBILINOGEN UR STRIP.AUTO-MCNC: NORMAL MG/DL
VENTRICULAR RATE: 60 BPM
WBC # BLD AUTO: 6.5 X10*3/UL (ref 4.4–11.3)

## 2024-10-30 PROCEDURE — 74174 CTA ABD&PLVS W/CONTRAST: CPT | Performed by: RADIOLOGY

## 2024-10-30 PROCEDURE — 81003 URINALYSIS AUTO W/O SCOPE: CPT | Performed by: NURSE PRACTITIONER

## 2024-10-30 PROCEDURE — 71275 CT ANGIOGRAPHY CHEST: CPT

## 2024-10-30 PROCEDURE — 36415 COLL VENOUS BLD VENIPUNCTURE: CPT | Performed by: NURSE PRACTITIONER

## 2024-10-30 PROCEDURE — 84484 ASSAY OF TROPONIN QUANT: CPT | Performed by: NURSE PRACTITIONER

## 2024-10-30 PROCEDURE — 70450 CT HEAD/BRAIN W/O DYE: CPT | Mod: 59

## 2024-10-30 PROCEDURE — 83735 ASSAY OF MAGNESIUM: CPT | Performed by: NURSE PRACTITIONER

## 2024-10-30 PROCEDURE — 99285 EMERGENCY DEPT VISIT HI MDM: CPT | Mod: 25 | Performed by: EMERGENCY MEDICINE

## 2024-10-30 PROCEDURE — 83880 ASSAY OF NATRIURETIC PEPTIDE: CPT | Performed by: NURSE PRACTITIONER

## 2024-10-30 PROCEDURE — 70496 CT ANGIOGRAPHY HEAD: CPT | Performed by: RADIOLOGY

## 2024-10-30 PROCEDURE — 71275 CT ANGIOGRAPHY CHEST: CPT | Performed by: RADIOLOGY

## 2024-10-30 PROCEDURE — 2550000001 HC RX 255 CONTRASTS: Performed by: NURSE PRACTITIONER

## 2024-10-30 PROCEDURE — 93005 ELECTROCARDIOGRAM TRACING: CPT

## 2024-10-30 PROCEDURE — 85025 COMPLETE CBC W/AUTO DIFF WBC: CPT | Performed by: NURSE PRACTITIONER

## 2024-10-30 PROCEDURE — 70496 CT ANGIOGRAPHY HEAD: CPT

## 2024-10-30 PROCEDURE — 80053 COMPREHEN METABOLIC PANEL: CPT | Performed by: NURSE PRACTITIONER

## 2024-10-30 PROCEDURE — 70450 CT HEAD/BRAIN W/O DYE: CPT | Performed by: RADIOLOGY

## 2024-10-30 RX ADMIN — IOHEXOL 100 ML: 350 INJECTION, SOLUTION INTRAVENOUS at 16:12

## 2024-10-30 RX ADMIN — IOHEXOL 100 ML: 350 INJECTION, SOLUTION INTRAVENOUS at 12:44

## 2024-10-30 ASSESSMENT — COLUMBIA-SUICIDE SEVERITY RATING SCALE - C-SSRS
6. HAVE YOU EVER DONE ANYTHING, STARTED TO DO ANYTHING, OR PREPARED TO DO ANYTHING TO END YOUR LIFE?: NO
2. HAVE YOU ACTUALLY HAD ANY THOUGHTS OF KILLING YOURSELF?: NO
1. IN THE PAST MONTH, HAVE YOU WISHED YOU WERE DEAD OR WISHED YOU COULD GO TO SLEEP AND NOT WAKE UP?: NO

## 2024-10-30 ASSESSMENT — PAIN - FUNCTIONAL ASSESSMENT: PAIN_FUNCTIONAL_ASSESSMENT: 0-10

## 2024-10-30 ASSESSMENT — PAIN DESCRIPTION - LOCATION: LOCATION: OTHER (COMMENT)

## 2024-10-30 ASSESSMENT — PAIN SCALES - GENERAL: PAINLEVEL_OUTOF10: 7

## 2024-11-07 ENCOUNTER — APPOINTMENT (OUTPATIENT)
Dept: CARDIAC REHAB | Facility: HOSPITAL | Age: 74
End: 2024-11-07

## 2024-11-07 PROCEDURE — 9430000001 HC PHASE III CARDIAC REHAB MONTHLY FEE

## 2024-11-08 ENCOUNTER — APPOINTMENT (OUTPATIENT)
Dept: RADIOLOGY | Facility: HOSPITAL | Age: 74
End: 2024-11-08
Payer: MEDICARE

## 2024-11-08 ENCOUNTER — APPOINTMENT (OUTPATIENT)
Dept: CARDIOLOGY | Facility: HOSPITAL | Age: 74
End: 2024-11-08
Payer: MEDICARE

## 2024-11-08 ENCOUNTER — HOSPITAL ENCOUNTER (INPATIENT)
Facility: HOSPITAL | Age: 74
LOS: 1 days | Discharge: HOME | End: 2024-11-09
Attending: EMERGENCY MEDICINE | Admitting: INTERNAL MEDICINE
Payer: MEDICARE

## 2024-11-08 DIAGNOSIS — R79.89 ELEVATED TROPONIN: ICD-10-CM

## 2024-11-08 DIAGNOSIS — I21.4 NON-ST ELEVATION (NSTEMI) MYOCARDIAL INFARCTION (MULTI): ICD-10-CM

## 2024-11-08 DIAGNOSIS — R07.9 CHEST PAIN, UNSPECIFIED TYPE: ICD-10-CM

## 2024-11-08 DIAGNOSIS — R07.9 CHEST PAIN: Primary | ICD-10-CM

## 2024-11-08 LAB
ACT BLD: 227 SEC (ref 83–199)
ACT BLD: 263 SEC (ref 83–199)
ACT BLD: NORMAL S
ACT BLD: NORMAL S
ALBUMIN SERPL BCP-MCNC: 4.1 G/DL (ref 3.4–5)
ALP SERPL-CCNC: 69 U/L (ref 33–136)
ALT SERPL W P-5'-P-CCNC: 12 U/L (ref 10–52)
ANION GAP SERPL CALC-SCNC: 10 MMOL/L (ref 10–20)
ANION GAP SERPL CALC-SCNC: 10 MMOL/L (ref 10–20)
APTT PPP: 30 SECONDS (ref 27–38)
AST SERPL W P-5'-P-CCNC: 16 U/L (ref 9–39)
ATRIAL RATE: 0 BPM
BASOPHILS # BLD AUTO: 0.03 X10*3/UL (ref 0–0.1)
BASOPHILS NFR BLD AUTO: 0.5 %
BILIRUB SERPL-MCNC: 0.3 MG/DL (ref 0–1.2)
BNP SERPL-MCNC: 29 PG/ML (ref 0–99)
BUN SERPL-MCNC: 21 MG/DL (ref 6–23)
BUN SERPL-MCNC: 21 MG/DL (ref 6–23)
CALCIUM SERPL-MCNC: 8.9 MG/DL (ref 8.6–10.3)
CALCIUM SERPL-MCNC: 9.2 MG/DL (ref 8.6–10.3)
CARDIAC TROPONIN I PNL SERPL HS: 111 NG/L (ref 0–20)
CARDIAC TROPONIN I PNL SERPL HS: 289 NG/L (ref 0–20)
CARDIAC TROPONIN I PNL SERPL HS: 55 NG/L (ref 0–20)
CARDIAC TROPONIN I PNL SERPL HS: 67 NG/L (ref 0–20)
CHLORIDE SERPL-SCNC: 102 MMOL/L (ref 98–107)
CHLORIDE SERPL-SCNC: 103 MMOL/L (ref 98–107)
CHOLEST SERPL-MCNC: 246 MG/DL (ref 0–199)
CHOLESTEROL/HDL RATIO: 4.7
CO2 SERPL-SCNC: 29 MMOL/L (ref 21–32)
CO2 SERPL-SCNC: 29 MMOL/L (ref 21–32)
CREAT SERPL-MCNC: 1.12 MG/DL (ref 0.5–1.3)
CREAT SERPL-MCNC: 1.15 MG/DL (ref 0.5–1.3)
EGFRCR SERPLBLD CKD-EPI 2021: 66 ML/MIN/1.73M*2
EGFRCR SERPLBLD CKD-EPI 2021: 68 ML/MIN/1.73M*2
EJECTION FRACTION APICAL 4 CHAMBER: 59.2
EJECTION FRACTION: 53 %
EOSINOPHIL # BLD AUTO: 0.18 X10*3/UL (ref 0–0.4)
EOSINOPHIL NFR BLD AUTO: 2.7 %
ERYTHROCYTE [DISTWIDTH] IN BLOOD BY AUTOMATED COUNT: 13.2 % (ref 11.5–14.5)
ERYTHROCYTE [DISTWIDTH] IN BLOOD BY AUTOMATED COUNT: 13.2 % (ref 11.5–14.5)
EST. AVERAGE GLUCOSE BLD GHB EST-MCNC: 126 MG/DL
GLUCOSE SERPL-MCNC: 105 MG/DL (ref 74–99)
GLUCOSE SERPL-MCNC: 107 MG/DL (ref 74–99)
HBA1C MFR BLD: 6 %
HCT VFR BLD AUTO: 39.8 % (ref 41–52)
HCT VFR BLD AUTO: 41.5 % (ref 41–52)
HDLC SERPL-MCNC: 52.4 MG/DL
HGB BLD-MCNC: 13.2 G/DL (ref 13.5–17.5)
HGB BLD-MCNC: 13.9 G/DL (ref 13.5–17.5)
IMM GRANULOCYTES # BLD AUTO: 0.01 X10*3/UL (ref 0–0.5)
IMM GRANULOCYTES NFR BLD AUTO: 0.2 % (ref 0–0.9)
INR PPP: 1 (ref 0.9–1.1)
LDLC SERPL CALC-MCNC: 169 MG/DL
LEFT ATRIUM VOLUME AREA LENGTH INDEX BSA: 26.7 ML/M2
LEFT VENTRICLE INTERNAL DIMENSION DIASTOLE: 4.9 CM (ref 3.5–6)
LEFT VENTRICULAR OUTFLOW TRACT DIAMETER: 2 CM
LV EJECTION FRACTION BIPLANE: 57 %
LYMPHOCYTES # BLD AUTO: 2.92 X10*3/UL (ref 0.8–3)
LYMPHOCYTES NFR BLD AUTO: 44.4 %
MAGNESIUM SERPL-MCNC: 1.92 MG/DL (ref 1.6–2.4)
MCH RBC QN AUTO: 31.7 PG (ref 26–34)
MCH RBC QN AUTO: 32 PG (ref 26–34)
MCHC RBC AUTO-ENTMCNC: 33.2 G/DL (ref 32–36)
MCHC RBC AUTO-ENTMCNC: 33.5 G/DL (ref 32–36)
MCV RBC AUTO: 96 FL (ref 80–100)
MCV RBC AUTO: 96 FL (ref 80–100)
MITRAL VALVE E/A RATIO: 0.99
MONOCYTES # BLD AUTO: 0.55 X10*3/UL (ref 0.05–0.8)
MONOCYTES NFR BLD AUTO: 8.4 %
NEUTROPHILS # BLD AUTO: 2.89 X10*3/UL (ref 1.6–5.5)
NEUTROPHILS NFR BLD AUTO: 43.8 %
NON HDL CHOLESTEROL: 194 MG/DL (ref 0–149)
NRBC BLD-RTO: 0 /100 WBCS (ref 0–0)
NRBC BLD-RTO: 0 /100 WBCS (ref 0–0)
P AXIS: 0 DEGREES
P AXIS: 17 DEGREES
P AXIS: 71 DEGREES
PLATELET # BLD AUTO: 177 X10*3/UL (ref 150–450)
PLATELET # BLD AUTO: 180 X10*3/UL (ref 150–450)
POTASSIUM SERPL-SCNC: 3.6 MMOL/L (ref 3.5–5.3)
POTASSIUM SERPL-SCNC: 3.9 MMOL/L (ref 3.5–5.3)
PR INTERVAL: 155 MS
PR INTERVAL: 186 MS
PR INTERVAL: 61 MS
PROT SERPL-MCNC: 6.6 G/DL (ref 6.4–8.2)
PROTHROMBIN TIME: 11.5 SECONDS (ref 9.8–12.8)
Q ONSET: 251 MS
Q ONSET: 251 MS
Q ONSET: 252 MS
QRS COUNT: 10 BEATS
QRS COUNT: 9 BEATS
QRS COUNT: 9 BEATS
QRS DURATION: 188 MS
QRS DURATION: 191 MS
QRS DURATION: 196 MS
QT INTERVAL: 518 MS
QT INTERVAL: 522 MS
QT INTERVAL: 530 MS
QTC CALCULATION(BAZETT): 518 MS
QTC CALCULATION(BAZETT): 522 MS
QTC CALCULATION(BAZETT): 530 MS
QTC FREDERICIA: 518 MS
QTC FREDERICIA: 522 MS
QTC FREDERICIA: 530 MS
R AXIS: -52 DEGREES
R AXIS: -52 DEGREES
R AXIS: -53 DEGREES
RBC # BLD AUTO: 4.16 X10*6/UL (ref 4.5–5.9)
RBC # BLD AUTO: 4.34 X10*6/UL (ref 4.5–5.9)
RIGHT VENTRICLE FREE WALL PEAK S': 11.1 CM/S
RIGHT VENTRICLE PEAK SYSTOLIC PRESSURE: 48.2 MMHG
SODIUM SERPL-SCNC: 137 MMOL/L (ref 136–145)
SODIUM SERPL-SCNC: 138 MMOL/L (ref 136–145)
T AXIS: 64 DEGREES
T AXIS: 66 DEGREES
T AXIS: 70 DEGREES
T OFFSET: 510 MS
T OFFSET: 512 MS
T OFFSET: 517 MS
TRICUSPID ANNULAR PLANE SYSTOLIC EXCURSION: 2 CM
TRIGL SERPL-MCNC: 121 MG/DL (ref 0–149)
UFH PPP CHRO-ACNC: 0.4 IU/ML
VENTRICULAR RATE: 60 BPM
VLDL: 24 MG/DL (ref 0–40)
WBC # BLD AUTO: 6.6 X10*3/UL (ref 4.4–11.3)
WBC # BLD AUTO: 6.8 X10*3/UL (ref 4.4–11.3)

## 2024-11-08 PROCEDURE — B2111ZZ FLUOROSCOPY OF MULTIPLE CORONARY ARTERIES USING LOW OSMOLAR CONTRAST: ICD-10-PCS | Performed by: INTERNAL MEDICINE

## 2024-11-08 PROCEDURE — 84484 ASSAY OF TROPONIN QUANT: CPT | Performed by: INTERNAL MEDICINE

## 2024-11-08 PROCEDURE — 93454 CORONARY ARTERY ANGIO S&I: CPT | Performed by: INTERNAL MEDICINE

## 2024-11-08 PROCEDURE — 2500000001 HC RX 250 WO HCPCS SELF ADMINISTERED DRUGS (ALT 637 FOR MEDICARE OP): Performed by: EMERGENCY MEDICINE

## 2024-11-08 PROCEDURE — 83735 ASSAY OF MAGNESIUM: CPT | Performed by: EMERGENCY MEDICINE

## 2024-11-08 PROCEDURE — 96374 THER/PROPH/DIAG INJ IV PUSH: CPT | Mod: 59

## 2024-11-08 PROCEDURE — 2060000001 HC INTERMEDIATE ICU ROOM DAILY

## 2024-11-08 PROCEDURE — 99152 MOD SED SAME PHYS/QHP 5/>YRS: CPT | Performed by: INTERNAL MEDICINE

## 2024-11-08 PROCEDURE — 80061 LIPID PANEL: CPT | Performed by: INTERNAL MEDICINE

## 2024-11-08 PROCEDURE — 2780000003 HC OR 278 NO HCPCS: Performed by: INTERNAL MEDICINE

## 2024-11-08 PROCEDURE — 85610 PROTHROMBIN TIME: CPT | Performed by: EMERGENCY MEDICINE

## 2024-11-08 PROCEDURE — 2720000007 HC OR 272 NO HCPCS: Performed by: INTERNAL MEDICINE

## 2024-11-08 PROCEDURE — C1753 CATH, INTRAVAS ULTRASOUND: HCPCS | Performed by: INTERNAL MEDICINE

## 2024-11-08 PROCEDURE — 4A023N7 MEASUREMENT OF CARDIAC SAMPLING AND PRESSURE, LEFT HEART, PERCUTANEOUS APPROACH: ICD-10-PCS | Performed by: INTERNAL MEDICINE

## 2024-11-08 PROCEDURE — C9600 PERC DRUG-EL COR STENT SING: HCPCS | Mod: LD | Performed by: INTERNAL MEDICINE

## 2024-11-08 PROCEDURE — 36415 COLL VENOUS BLD VENIPUNCTURE: CPT | Performed by: INTERNAL MEDICINE

## 2024-11-08 PROCEDURE — 80053 COMPREHEN METABOLIC PANEL: CPT | Performed by: EMERGENCY MEDICINE

## 2024-11-08 PROCEDURE — 2500000004 HC RX 250 GENERAL PHARMACY W/ HCPCS (ALT 636 FOR OP/ED): Performed by: INTERNAL MEDICINE

## 2024-11-08 PROCEDURE — 80048 BASIC METABOLIC PNL TOTAL CA: CPT | Mod: CCI | Performed by: INTERNAL MEDICINE

## 2024-11-08 PROCEDURE — 85347 COAGULATION TIME ACTIVATED: CPT

## 2024-11-08 PROCEDURE — 84484 ASSAY OF TROPONIN QUANT: CPT | Performed by: EMERGENCY MEDICINE

## 2024-11-08 PROCEDURE — 99222 1ST HOSP IP/OBS MODERATE 55: CPT | Performed by: INTERNAL MEDICINE

## 2024-11-08 PROCEDURE — C1769 GUIDE WIRE: HCPCS | Performed by: INTERNAL MEDICINE

## 2024-11-08 PROCEDURE — 71045 X-RAY EXAM CHEST 1 VIEW: CPT | Performed by: SURGERY

## 2024-11-08 PROCEDURE — C1760 CLOSURE DEV, VASC: HCPCS | Performed by: INTERNAL MEDICINE

## 2024-11-08 PROCEDURE — 99153 MOD SED SAME PHYS/QHP EA: CPT | Performed by: INTERNAL MEDICINE

## 2024-11-08 PROCEDURE — 92928 PRQ TCAT PLMT NTRAC ST 1 LES: CPT | Performed by: INTERNAL MEDICINE

## 2024-11-08 PROCEDURE — 85025 COMPLETE CBC W/AUTO DIFF WBC: CPT | Performed by: EMERGENCY MEDICINE

## 2024-11-08 PROCEDURE — 92978 ENDOLUMINL IVUS OCT C 1ST: CPT | Performed by: INTERNAL MEDICINE

## 2024-11-08 PROCEDURE — 36415 COLL VENOUS BLD VENIPUNCTURE: CPT | Performed by: EMERGENCY MEDICINE

## 2024-11-08 PROCEDURE — 93005 ELECTROCARDIOGRAM TRACING: CPT

## 2024-11-08 PROCEDURE — 027034Z DILATION OF CORONARY ARTERY, ONE ARTERY WITH DRUG-ELUTING INTRALUMINAL DEVICE, PERCUTANEOUS APPROACH: ICD-10-PCS | Performed by: INTERNAL MEDICINE

## 2024-11-08 PROCEDURE — C1725 CATH, TRANSLUMIN NON-LASER: HCPCS | Performed by: INTERNAL MEDICINE

## 2024-11-08 PROCEDURE — 93571 IV DOP VEL&/PRESS C FLO 1ST: CPT | Performed by: INTERNAL MEDICINE

## 2024-11-08 PROCEDURE — 85520 HEPARIN ASSAY: CPT | Performed by: INTERNAL MEDICINE

## 2024-11-08 PROCEDURE — 99285 EMERGENCY DEPT VISIT HI MDM: CPT | Mod: 25

## 2024-11-08 PROCEDURE — 85730 THROMBOPLASTIN TIME PARTIAL: CPT | Performed by: EMERGENCY MEDICINE

## 2024-11-08 PROCEDURE — 2550000001 HC RX 255 CONTRASTS: Performed by: INTERNAL MEDICINE

## 2024-11-08 PROCEDURE — 2500000002 HC RX 250 W HCPCS SELF ADMINISTERED DRUGS (ALT 637 FOR MEDICARE OP, ALT 636 FOR OP/ED): Performed by: INTERNAL MEDICINE

## 2024-11-08 PROCEDURE — C1874 STENT, COATED/COV W/DEL SYS: HCPCS | Performed by: INTERNAL MEDICINE

## 2024-11-08 PROCEDURE — 85027 COMPLETE CBC AUTOMATED: CPT | Performed by: INTERNAL MEDICINE

## 2024-11-08 PROCEDURE — 83880 ASSAY OF NATRIURETIC PEPTIDE: CPT | Performed by: EMERGENCY MEDICINE

## 2024-11-08 PROCEDURE — C8929 TTE W OR WO FOL WCON,DOPPLER: HCPCS

## 2024-11-08 PROCEDURE — C1894 INTRO/SHEATH, NON-LASER: HCPCS | Performed by: INTERNAL MEDICINE

## 2024-11-08 PROCEDURE — C1887 CATHETER, GUIDING: HCPCS | Performed by: INTERNAL MEDICINE

## 2024-11-08 PROCEDURE — 83036 HEMOGLOBIN GLYCOSYLATED A1C: CPT | Performed by: INTERNAL MEDICINE

## 2024-11-08 PROCEDURE — 2500000002 HC RX 250 W HCPCS SELF ADMINISTERED DRUGS (ALT 637 FOR MEDICARE OP, ALT 636 FOR OP/ED): Performed by: NURSE PRACTITIONER

## 2024-11-08 PROCEDURE — 93306 TTE W/DOPPLER COMPLETE: CPT | Performed by: STUDENT IN AN ORGANIZED HEALTH CARE EDUCATION/TRAINING PROGRAM

## 2024-11-08 PROCEDURE — 71045 X-RAY EXAM CHEST 1 VIEW: CPT

## 2024-11-08 PROCEDURE — 2500000001 HC RX 250 WO HCPCS SELF ADMINISTERED DRUGS (ALT 637 FOR MEDICARE OP): Performed by: INTERNAL MEDICINE

## 2024-11-08 DEVICE — STENT ONYXNG35030UX ONYX 3.50X30RX
Type: IMPLANTABLE DEVICE | Site: CORONARY | Status: FUNCTIONAL
Brand: ONYX FRONTIER™

## 2024-11-08 RX ORDER — FENTANYL CITRATE 50 UG/ML
INJECTION, SOLUTION INTRAMUSCULAR; INTRAVENOUS AS NEEDED
Status: DISCONTINUED | OUTPATIENT
Start: 2024-11-08 | End: 2024-11-08 | Stop reason: HOSPADM

## 2024-11-08 RX ORDER — ATENOLOL 25 MG/1
50 TABLET ORAL DAILY
Status: DISCONTINUED | OUTPATIENT
Start: 2024-11-08 | End: 2024-11-09

## 2024-11-08 RX ORDER — ACETAMINOPHEN 325 MG/1
650 TABLET ORAL EVERY 4 HOURS PRN
Status: DISCONTINUED | OUTPATIENT
Start: 2024-11-08 | End: 2024-11-09 | Stop reason: HOSPADM

## 2024-11-08 RX ORDER — TALC
3 POWDER (GRAM) TOPICAL NIGHTLY PRN
Status: DISCONTINUED | OUTPATIENT
Start: 2024-11-08 | End: 2024-11-09 | Stop reason: HOSPADM

## 2024-11-08 RX ORDER — ATENOLOL 50 MG/1
50 TABLET ORAL DAILY
COMMUNITY
End: 2024-11-09 | Stop reason: HOSPADM

## 2024-11-08 RX ORDER — GUAIFENESIN/DEXTROMETHORPHAN 100-10MG/5
5 SYRUP ORAL EVERY 4 HOURS PRN
Status: DISCONTINUED | OUTPATIENT
Start: 2024-11-08 | End: 2024-11-09 | Stop reason: HOSPADM

## 2024-11-08 RX ORDER — HEPARIN SODIUM 1000 [USP'U]/ML
INJECTION, SOLUTION INTRAVENOUS; SUBCUTANEOUS AS NEEDED
Status: DISCONTINUED | OUTPATIENT
Start: 2024-11-08 | End: 2024-11-08 | Stop reason: HOSPADM

## 2024-11-08 RX ORDER — LEVOTHYROXINE SODIUM 50 UG/1
50 TABLET ORAL DAILY
Status: DISCONTINUED | OUTPATIENT
Start: 2024-11-08 | End: 2024-11-09 | Stop reason: HOSPADM

## 2024-11-08 RX ORDER — LISINOPRIL 10 MG/1
10 TABLET ORAL DAILY
Status: DISCONTINUED | OUTPATIENT
Start: 2024-11-08 | End: 2024-11-09 | Stop reason: HOSPADM

## 2024-11-08 RX ORDER — ONDANSETRON 4 MG/1
4 TABLET, FILM COATED ORAL EVERY 8 HOURS PRN
Status: DISCONTINUED | OUTPATIENT
Start: 2024-11-08 | End: 2024-11-09 | Stop reason: HOSPADM

## 2024-11-08 RX ORDER — NAPROXEN SODIUM 220 MG/1
81 TABLET, FILM COATED ORAL DAILY
Status: DISCONTINUED | OUTPATIENT
Start: 2024-11-08 | End: 2024-11-09 | Stop reason: HOSPADM

## 2024-11-08 RX ORDER — NITROGLYCERIN 0.4 MG/1
0.4 TABLET SUBLINGUAL ONCE
Status: COMPLETED | OUTPATIENT
Start: 2024-11-08 | End: 2024-11-08

## 2024-11-08 RX ORDER — NAPROXEN SODIUM 220 MG/1
162 TABLET, FILM COATED ORAL ONCE
Status: COMPLETED | OUTPATIENT
Start: 2024-11-08 | End: 2024-11-08

## 2024-11-08 RX ORDER — MORPHINE SULFATE 2 MG/ML
2 INJECTION, SOLUTION INTRAMUSCULAR; INTRAVENOUS EVERY 6 HOURS PRN
Status: DISCONTINUED | OUTPATIENT
Start: 2024-11-08 | End: 2024-11-09 | Stop reason: HOSPADM

## 2024-11-08 RX ORDER — ACETAMINOPHEN 650 MG/1
650 SUPPOSITORY RECTAL EVERY 4 HOURS PRN
Status: DISCONTINUED | OUTPATIENT
Start: 2024-11-08 | End: 2024-11-09 | Stop reason: HOSPADM

## 2024-11-08 RX ORDER — ONDANSETRON HYDROCHLORIDE 2 MG/ML
4 INJECTION, SOLUTION INTRAVENOUS EVERY 8 HOURS PRN
Status: DISCONTINUED | OUTPATIENT
Start: 2024-11-08 | End: 2024-11-09 | Stop reason: HOSPADM

## 2024-11-08 RX ORDER — POLYETHYLENE GLYCOL 3350 17 G/17G
17 POWDER, FOR SOLUTION ORAL 2 TIMES DAILY PRN
Status: DISCONTINUED | OUTPATIENT
Start: 2024-11-08 | End: 2024-11-09 | Stop reason: HOSPADM

## 2024-11-08 RX ORDER — MIDAZOLAM HYDROCHLORIDE 1 MG/ML
INJECTION, SOLUTION INTRAMUSCULAR; INTRAVENOUS AS NEEDED
Status: DISCONTINUED | OUTPATIENT
Start: 2024-11-08 | End: 2024-11-08 | Stop reason: HOSPADM

## 2024-11-08 RX ORDER — LIDOCAINE HYDROCHLORIDE 20 MG/ML
INJECTION, SOLUTION INFILTRATION; PERINEURAL AS NEEDED
Status: DISCONTINUED | OUTPATIENT
Start: 2024-11-08 | End: 2024-11-08 | Stop reason: HOSPADM

## 2024-11-08 RX ORDER — GUAIFENESIN 600 MG/1
600 TABLET, EXTENDED RELEASE ORAL EVERY 12 HOURS PRN
Status: DISCONTINUED | OUTPATIENT
Start: 2024-11-08 | End: 2024-11-09 | Stop reason: HOSPADM

## 2024-11-08 RX ORDER — HYDROCHLOROTHIAZIDE 12.5 MG/1
12.5 CAPSULE ORAL DAILY
Status: DISCONTINUED | OUTPATIENT
Start: 2024-11-08 | End: 2024-11-09 | Stop reason: HOSPADM

## 2024-11-08 RX ORDER — ALUMINUM HYDROXIDE, MAGNESIUM HYDROXIDE, AND SIMETHICONE 1200; 120; 1200 MG/30ML; MG/30ML; MG/30ML
30 SUSPENSION ORAL EVERY 6 HOURS PRN
Status: DISCONTINUED | OUTPATIENT
Start: 2024-11-08 | End: 2024-11-09 | Stop reason: HOSPADM

## 2024-11-08 RX ORDER — LEVOTHYROXINE SODIUM 50 UG/1
50 TABLET ORAL DAILY
COMMUNITY

## 2024-11-08 RX ORDER — ACETAMINOPHEN 160 MG/5ML
650 SUSPENSION ORAL EVERY 4 HOURS PRN
Status: DISCONTINUED | OUTPATIENT
Start: 2024-11-08 | End: 2024-11-09 | Stop reason: HOSPADM

## 2024-11-08 RX ORDER — HEPARIN SODIUM 10000 [USP'U]/100ML
0-4000 INJECTION, SOLUTION INTRAVENOUS CONTINUOUS
Status: DISCONTINUED | OUTPATIENT
Start: 2024-11-08 | End: 2024-11-09 | Stop reason: HOSPADM

## 2024-11-08 RX ORDER — WATER
240 LIQUID (ML) MISCELLANEOUS
Status: SHIPPED | OUTPATIENT
Start: 2024-11-08 | End: 2024-11-08

## 2024-11-08 RX ORDER — NITROGLYCERIN 40 MG/100ML
INJECTION INTRAVENOUS AS NEEDED
Status: DISCONTINUED | OUTPATIENT
Start: 2024-11-08 | End: 2024-11-08 | Stop reason: HOSPADM

## 2024-11-08 SDOH — SOCIAL STABILITY: SOCIAL INSECURITY: WERE YOU ABLE TO COMPLETE ALL THE BEHAVIORAL HEALTH SCREENINGS?: YES

## 2024-11-08 SDOH — SOCIAL STABILITY: SOCIAL INSECURITY: HAVE YOU HAD THOUGHTS OF HARMING ANYONE ELSE?: NO

## 2024-11-08 SDOH — SOCIAL STABILITY: SOCIAL INSECURITY: WITHIN THE LAST YEAR, HAVE YOU BEEN HUMILIATED OR EMOTIONALLY ABUSED IN OTHER WAYS BY YOUR PARTNER OR EX-PARTNER?: NO

## 2024-11-08 SDOH — ECONOMIC STABILITY: INCOME INSECURITY: IN THE PAST 12 MONTHS HAS THE ELECTRIC, GAS, OIL, OR WATER COMPANY THREATENED TO SHUT OFF SERVICES IN YOUR HOME?: NO

## 2024-11-08 SDOH — SOCIAL STABILITY: SOCIAL INSECURITY: HAVE YOU HAD ANY THOUGHTS OF HARMING ANYONE ELSE?: NO

## 2024-11-08 SDOH — SOCIAL STABILITY: SOCIAL INSECURITY
WITHIN THE LAST YEAR, HAVE YOU BEEN KICKED, HIT, SLAPPED, OR OTHERWISE PHYSICALLY HURT BY YOUR PARTNER OR EX-PARTNER?: NO

## 2024-11-08 SDOH — ECONOMIC STABILITY: FOOD INSECURITY: WITHIN THE PAST 12 MONTHS, YOU WORRIED THAT YOUR FOOD WOULD RUN OUT BEFORE YOU GOT THE MONEY TO BUY MORE.: NEVER TRUE

## 2024-11-08 SDOH — SOCIAL STABILITY: SOCIAL INSECURITY: DO YOU FEEL UNSAFE GOING BACK TO THE PLACE WHERE YOU ARE LIVING?: NO

## 2024-11-08 SDOH — SOCIAL STABILITY: SOCIAL INSECURITY: ARE YOU OR HAVE YOU BEEN THREATENED OR ABUSED PHYSICALLY, EMOTIONALLY, OR SEXUALLY BY ANYONE?: NO

## 2024-11-08 SDOH — SOCIAL STABILITY: SOCIAL INSECURITY: WITHIN THE LAST YEAR, HAVE YOU BEEN AFRAID OF YOUR PARTNER OR EX-PARTNER?: NO

## 2024-11-08 SDOH — SOCIAL STABILITY: SOCIAL INSECURITY: ABUSE: ADULT

## 2024-11-08 SDOH — SOCIAL STABILITY: SOCIAL INSECURITY: HAS ANYONE EVER THREATENED TO HURT YOUR FAMILY OR YOUR PETS?: NO

## 2024-11-08 SDOH — SOCIAL STABILITY: SOCIAL INSECURITY
WITHIN THE LAST YEAR, HAVE YOU BEEN RAPED OR FORCED TO HAVE ANY KIND OF SEXUAL ACTIVITY BY YOUR PARTNER OR EX-PARTNER?: NO

## 2024-11-08 SDOH — SOCIAL STABILITY: SOCIAL INSECURITY: DO YOU FEEL ANYONE HAS EXPLOITED OR TAKEN ADVANTAGE OF YOU FINANCIALLY OR OF YOUR PERSONAL PROPERTY?: NO

## 2024-11-08 SDOH — SOCIAL STABILITY: SOCIAL INSECURITY: DOES ANYONE TRY TO KEEP YOU FROM HAVING/CONTACTING OTHER FRIENDS OR DOING THINGS OUTSIDE YOUR HOME?: NO

## 2024-11-08 SDOH — ECONOMIC STABILITY: FOOD INSECURITY: WITHIN THE PAST 12 MONTHS, THE FOOD YOU BOUGHT JUST DIDN'T LAST AND YOU DIDN'T HAVE MONEY TO GET MORE.: NEVER TRUE

## 2024-11-08 SDOH — SOCIAL STABILITY: SOCIAL INSECURITY: ARE THERE ANY APPARENT SIGNS OF INJURIES/BEHAVIORS THAT COULD BE RELATED TO ABUSE/NEGLECT?: NO

## 2024-11-08 ASSESSMENT — COGNITIVE AND FUNCTIONAL STATUS - GENERAL
DAILY ACTIVITIY SCORE: 24
DAILY ACTIVITIY SCORE: 24
PATIENT BASELINE BEDBOUND: NO
MOBILITY SCORE: 24
MOBILITY SCORE: 24

## 2024-11-08 ASSESSMENT — PATIENT HEALTH QUESTIONNAIRE - PHQ9
1. LITTLE INTEREST OR PLEASURE IN DOING THINGS: NOT AT ALL
2. FEELING DOWN, DEPRESSED OR HOPELESS: NOT AT ALL
SUM OF ALL RESPONSES TO PHQ9 QUESTIONS 1 & 2: 0

## 2024-11-08 ASSESSMENT — ACTIVITIES OF DAILY LIVING (ADL)
DRESSING YOURSELF: INDEPENDENT
ASSISTIVE_DEVICE: EYEGLASSES
JUDGMENT_ADEQUATE_SAFELY_COMPLETE_DAILY_ACTIVITIES: YES
GROOMING: INDEPENDENT
FEEDING YOURSELF: INDEPENDENT
HEARING - RIGHT EAR: FUNCTIONAL
LACK_OF_TRANSPORTATION: NO
WALKS IN HOME: INDEPENDENT
TOILETING: INDEPENDENT
HEARING - LEFT EAR: FUNCTIONAL
PATIENT'S MEMORY ADEQUATE TO SAFELY COMPLETE DAILY ACTIVITIES?: YES
BATHING: INDEPENDENT
ADEQUATE_TO_COMPLETE_ADL: YES

## 2024-11-08 ASSESSMENT — LIFESTYLE VARIABLES
HAVE YOU EVER FELT YOU SHOULD CUT DOWN ON YOUR DRINKING: NO
SKIP TO QUESTIONS 9-10: 1
HOW OFTEN DO YOU HAVE A DRINK CONTAINING ALCOHOL: NEVER
TOTAL SCORE: 0
AUDIT-C TOTAL SCORE: 0
EVER HAD A DRINK FIRST THING IN THE MORNING TO STEADY YOUR NERVES TO GET RID OF A HANGOVER: NO
HOW MANY STANDARD DRINKS CONTAINING ALCOHOL DO YOU HAVE ON A TYPICAL DAY: PATIENT DOES NOT DRINK
HOW OFTEN DO YOU HAVE 6 OR MORE DRINKS ON ONE OCCASION: NEVER
AUDIT-C TOTAL SCORE: 0
EVER FELT BAD OR GUILTY ABOUT YOUR DRINKING: NO
HAVE PEOPLE ANNOYED YOU BY CRITICIZING YOUR DRINKING: NO

## 2024-11-08 ASSESSMENT — PAIN SCALES - GENERAL
PAINLEVEL_OUTOF10: 1
PAINLEVEL_OUTOF10: 0 - NO PAIN
PAINLEVEL_OUTOF10: 0 - NO PAIN
PAINLEVEL_OUTOF10: 1
PAINLEVEL_OUTOF10: 4
PAINLEVEL_OUTOF10: 0 - NO PAIN
PAINLEVEL_OUTOF10: 0 - NO PAIN

## 2024-11-08 ASSESSMENT — PAIN DESCRIPTION - LOCATION
LOCATION: CHEST
LOCATION: CHEST

## 2024-11-08 ASSESSMENT — COLUMBIA-SUICIDE SEVERITY RATING SCALE - C-SSRS
2. HAVE YOU ACTUALLY HAD ANY THOUGHTS OF KILLING YOURSELF?: NO
2. HAVE YOU ACTUALLY HAD ANY THOUGHTS OF KILLING YOURSELF?: NO
1. IN THE PAST MONTH, HAVE YOU WISHED YOU WERE DEAD OR WISHED YOU COULD GO TO SLEEP AND NOT WAKE UP?: NO
6. HAVE YOU EVER DONE ANYTHING, STARTED TO DO ANYTHING, OR PREPARED TO DO ANYTHING TO END YOUR LIFE?: NO
1. IN THE PAST MONTH, HAVE YOU WISHED YOU WERE DEAD OR WISHED YOU COULD GO TO SLEEP AND NOT WAKE UP?: NO
6. HAVE YOU EVER DONE ANYTHING, STARTED TO DO ANYTHING, OR PREPARED TO DO ANYTHING TO END YOUR LIFE?: NO

## 2024-11-08 ASSESSMENT — PAIN - FUNCTIONAL ASSESSMENT
PAIN_FUNCTIONAL_ASSESSMENT: 0-10

## 2024-11-08 ASSESSMENT — PAIN DESCRIPTION - PROGRESSION: CLINICAL_PROGRESSION: NOT CHANGED

## 2024-11-08 ASSESSMENT — PAIN DESCRIPTION - PAIN TYPE: TYPE: ACUTE PAIN

## 2024-11-08 NOTE — PROGRESS NOTES
Garett Stafford is a 74 y.o. male on day 0 of admission presenting with Chest pain.      Subjective   Presented with chest pain and admitted for NSTMI on heparin drip.  Cardiology consulted and plan for cardiac cath today.  No chest pain at present.        Objective     Last Recorded Vitals  /81   Pulse 59   Temp 36.2 °C (97.1 °F) (Tympanic)   Resp 16   Wt 121 kg (266 lb 0.5 oz)   SpO2 100%   Intake/Output last 3 Shifts:    Intake/Output Summary (Last 24 hours) at 11/8/2024 1423  Last data filed at 11/8/2024 1344  Gross per 24 hour   Intake --   Output 5 ml   Net -5 ml       Admission Weight  Weight: 118 kg (260 lb) (11/08/24 0213)    Daily Weight  11/08/24 : 121 kg (266 lb 0.5 oz)    Image Results  Electrocardiogram, 12-lead PRN ACS symptoms  Ventricular-paced rhythm  No further analysis attempted due to paced rhythm  ECG 12 lead  Ventricular-paced rhythm  No further analysis attempted due to paced rhythm  ECG 12 lead  Ventricular-paced rhythm  No further analysis attempted due to paced rhythm  XR chest 1 view  Narrative: Interpreted By:  Alexis Saldivar,   STUDY:  XR CHEST 1 VIEW;  11/8/2024 2:39 am      INDICATION:  Signs/Symptoms:cp.          COMPARISON:  07/29/2021.      ACCESSION NUMBER(S):  [id number]      ORDERING CLINICIAN:  HUSSEIN CHAVIRA      FINDINGS:  AP radiograph of the chest was provided.      Limited slightly by soft tissue attenuation factors and AP technique.  Apical lordosis.      Stably configured cardiac pacing device.      CARDIOMEDIASTINAL SILHOUETTE:  Cardiac silhouette is normal size. Tortuous thoracic aorta.      LUNGS:  No focal consolidation, sizeable pleural effusion, or pneumothorax.      ABDOMEN:  No remarkable upper abdominal findings.      BONES:  No acute osseous changes.      Impression: 1.  No definite evidence of acute cardiopulmonary process.              MACRO:  None      Signed by: Alexis Saldivar 11/8/2024 2:45 AM  Dictation workstation:   [id number]      Physical  Exam  Constitutional:       Appearance: Normal appearance.   Cardiovascular:      Rate and Rhythm: Normal rate and regular rhythm.      Heart sounds: Normal heart sounds.   Pulmonary:      Effort: Pulmonary effort is normal.      Breath sounds: Normal breath sounds.   Abdominal:      General: Bowel sounds are normal.      Palpations: Abdomen is soft.   Musculoskeletal:      Right lower leg: No edema.      Left lower leg: No edema.   Neurological:      General: No focal deficit present.      Mental Status: He is alert and oriented to person, place, and time.         Relevant Results               Assessment/Plan    NSTMI- on heparin drip. Cardiology consulted and plan for cardiac cath today   HTN  Hypothyroidism   Known CAD  Plan:  Cardiology consulted and plan for cardiac cath today  Resume home medications as able.                Assessment & Plan  Chest pain    Elevated troponin                  Lawrence Siegel MD

## 2024-11-08 NOTE — Clinical Note
Angioplasty of the proximal left anterior descending lesion. Inflation 1: Pressure = 14 kalyn; Duration = 8 sec. Inflation 2: Pressure = 16 kalyn; Duration = 7 sec.

## 2024-11-08 NOTE — PROGRESS NOTES
Garett Stafford is a 74 y.o. male admitted for Chest pain. Pharmacy reviewed the patient's [id number] medications and allergies for accuracy.    The list below reflects the PTA list prior to pharmacy medication history. A summary a changes to the PTA medication list has been listed below. Please review each medication in order reconciliation for additional clarification and justification.    [facility] information:  [facility]    Medications added:  Atenolol 50mg every day   Lisinopril/hydrochlorothiazide 10/12.5mg every day   Levothyroxine 50mcg every day     Medications modified:    Medications to be removed:  Methocarbamol 500mg    Medications of concern:      Prior to Admission Medications   Prescriptions Last Dose Informant Patient Reported? Taking?   methocarbamol (Robaxin) 500 mg tablet   No No   Sig: Take 1 tablet (500 mg) by mouth 2 times a day for 10 days.      Facility-Administered Medications: None       Isabela Royal

## 2024-11-08 NOTE — H&P
[facility]    Hospital Medicine History & Physical     Assessment & Plan     ASSESSMENT    74M with hx of CAD s/p stents and hypertension presents with chest pain and found to have an NSTEMI.    PLAN    NSTEMI  -CAD history s/p multiple stents most recently in 2020  -Presents with chest pain and found to have troponin of 111, no EKG changes  -ASA 81 mg daily, heparin infusion  -Allergy to statin and low normal HR so hold off on statin and beta-blocker for now  -Echocardiogram, lipid panel, hemoglobin A1c  -N.p.o. for cardiology consultation    Essential Hypertension  -Home medications once verified    DVT Prophy  -Heparin infusion    Disposition  -Stepdown with telemetry      Diego Cast MD    History of Present Illness     Garett Stafford is a 74 y.o. with hx of CAD s/p stents and hypertension presents with chest pain. Patient was in his normal state of health until about a week ago when he started experiencing pain in both armpits.  Was seen in the ED and workup negative.  Ultimately discharged home.  This evening he was in bed and experienced similar pain but pain also in chest, left neck, and left arm.  Much more severe.  Feels like pressure sensation.  Denies shortness of breath or cough.  Denies nausea or vomiting.  History of CAD s/p multiple stents most recently in 2020.  [facility]  In the ED, VSS.  Troponin 111.  EKG without ischemic changes.  Admitted to medicine.    Review of Systems     A Comprehensive greater than 10 system review of systems was carried out.  Pertinent positives and negatives are noted above.  Otherwise negative for contributory information.     Past Medical History     Past Medical History:   Diagnosis Date    Aneurysm (CMS-HCC)     low back per pt    Hypertension     Pacemaker      Medications     No current facility-administered medications on file prior to encounter.     Current Outpatient Medications on File  "Prior to Encounter   Medication Sig Dispense Refill    methocarbamol (Robaxin) 500 mg tablet Take 1 tablet (500 mg) by mouth 2 times a day for 10 days. 20 tablet 0      Past Surgical History     Past Surgical History:   Procedure Laterality Date    APPENDECTOMY      CARDIAC SURGERY      JOINT REPLACEMENT      TONSILLECTOMY       Family History   Reviewed and non-contributory to presenting complaints    Allergies     Allergies   Allergen Reactions    Colestipol Other     Unable to move left arm    Demerol [Meperidine] Other     tachycardia    Lipitor [Atorvastatin] Other     Unable  to use left arm    Simvastatin Other     Unable to use left arm    Augmentin [Amoxicillin-Pot Clavulanate] Itching and Rash    Adhes. Band-Tape-Benzalkonium Other     Causes redness      Social History     Social History     Tobacco Use    Smoking status: Former     Types: Cigarettes    Smokeless tobacco: Never   Substance Use Topics    Alcohol use: Yes     Comment: rare     Physical Exam   Blood pressure 125/77, pulse 59, temperature 36.9 °C (98.4 °F), temperature source Temporal, resp. rate 20, height 1.778 m (5' 10\"), weight 118 kg (260 lb), SpO2 97%.    General: Ill appearing, cooperative with exam, in NAD.  HEENT: Atraumatic. No erythema in posterior pharynx.  Lymph: No cervical or inguinal lymphadenopathy.  Cardiac: RRR. No murmurs.  Lungs: CTAB. Nl WOB.  Abd: Non-tender. No rebound or gaurding. Nl bowel sounds.  Ext: No edema. 2+ pulses.  Skin: No rashes, abrasions, or contusions.  Psych: A&Ox3. Nl affect.  Neuro: 5/5 strength. Sensation intact.    Labs & Imaging     Reviewed and Pertinent results discussed in assessment and plan.      "

## 2024-11-08 NOTE — Clinical Note
Vessel: LAD (proximal). Stent repositioned. Inflation 1: Pressure = 12 kalyn; Duration = 10 sec. Deployed

## 2024-11-08 NOTE — Clinical Note
Closure device placed in the right radial artery. Site closed by radial compression system. 14 ml air

## 2024-11-08 NOTE — H&P (VIEW-ONLY)
Consults  History Of Present Illness:    Garett Stafford is a 74 y.o. male  with hx of CAD s/p stents and hypertension presents with chest pain. Patient was in his normal state of health until about a week ago when he started experiencing pain in both armpits.  Was seen in the ED and workup negative.  Ultimately discharged home.  This evening he was in bed and experienced similar pain but pain also in chest, left neck, and left arm.  Much more severe.  Feels like pressure sensation.  Denies shortness of breath or cough.  Denies nausea or vomiting.  History of CAD s/p multiple stents most recently in 2020.  [facility]  In the ED, VSS.  Troponin 111.  EKG without ischemic changes.  Admitted to medicine.      Last Recorded Vitals:  Vitals:    11/08/24 0614 11/08/24 0634 11/08/24 0700 11/08/24 0843   BP:  127/70 124/72 130/82   BP Location:    Left arm   Patient Position:    Lying   Pulse:  60 59 60   Resp:  18 15 18   Temp:    35.7 °C (96.2 °F)   TempSrc:    Temporal   SpO2:  99% 95% 97%   Weight: 121 kg (266 lb 0.5 oz)      Height:           Last Labs:  CBC - 11/8/2024:  5:03 AM  6.8 13.2 180    39.8      CMP - 11/8/2024:  5:03 AM  8.9 6.6 16 --- 0.3   _ 4.1 12 69      PTT - 11/8/2024:  6:20 AM  1.0   11.5 30     Troponin I, High Sensitivity   Date/Time Value Ref Range Status   11/08/2024 05:03  (HH) 0 - 20 ng/L Final     Comment:     Previous result verified on 11/8/2024 0307 on specimen/case [id number] called with component TRPHS for procedure Troponin I, High Sensitivity, Initial with value 55 ng/L.   11/08/2024 03:30 AM 67 (HH) 0 - 20 ng/L Final     Comment:     Previous result verified on 11/8/2024 0307 on specimen/case [id number] called with component TRPHS for procedure Troponin I, High Sensitivity, Initial with value 55 ng/L.   11/08/2024 02:25 AM 55 (HH) 0 - 20 ng/L Final     BNP   Date/Time Value Ref Range Status   11/08/2024 02:25 AM 29 0 - 99 pg/mL Final  "  10/30/2024 10:35 AM 23 0 - 99 pg/mL Final     Hemoglobin A1C   Date/Time Value Ref Range Status   11/08/2024 02:25 AM 6.0 (H) See comment % Final     LDL Calculated   Date/Time Value Ref Range Status   11/08/2024 03:30  (H) <=99 mg/dL Final     Comment:                                 Near   Borderline      AGE      Desirable  Optimal    High     High     Very High     0-19 Y     0 - 109     ---    110-129   >/= 130     ----    20-24 Y     0 - 119     ---    120-159   >/= 160     ----      >24 Y     0 -  99   100-129  130-159   160-189     >/=190       VLDL   Date/Time Value Ref Range Status   11/08/2024 03:30 AM 24 0 - 40 mg/dL Final      Last I/O:  No intake/output data recorded.    Past Cardiology Tests (Last 3 Years):  EKG:  ECG 12 lead 10/30/2024 (Preliminary)    Echo:  No results found for this or any previous visit from the past 1095 days.    Ejection Fractions:  No results found for: \"EF\"  Cath:  No results found for this or any previous visit from the past 1095 days.    Stress Test:  No results found for this or any previous visit from the past 1095 days.    Cardiac Imaging:  No results found for this or any previous visit from the past 1095 days.      Past Medical History:  He has a past medical history of Aneurysm (CMS-[facility]), Hypertension, and Pacemaker.    Past Surgical History:  He has a past surgical history that includes Cardiac surgery; Appendectomy; Tonsillectomy; and Joint replacement.      Social History:  He reports that he has quit smoking. His smoking use included cigarettes. He has never used smokeless tobacco. He reports current alcohol use. He reports that he does not use drugs.    Family History:  No family history on file.     Allergies:  Colestipol, Demerol [meperidine], Lipitor [atorvastatin], Simvastatin, Augmentin [amoxicillin-pot clavulanate], and Adhes. band-tape-benzalkonium    Inpatient Medications:  Scheduled medications   Medication Dose Route Frequency    aspirin  81 mg " oral Daily     PRN medications   Medication    acetaminophen    Or    acetaminophen    Or    acetaminophen    alum-mag hydroxide-simeth    benzocaine-menthol    dextromethorphan-guaifenesin    guaiFENesin    heparin    melatonin    ondansetron    Or    ondansetron    polyethylene glycol     Continuous Medications   Medication Dose Last Rate    heparin  0-4,000 Units/hr 1,000 Units/hr (11/08/24 0639)     Outpatient Medications:  Current Outpatient Medications   Medication Instructions    methocarbamol (ROBAXIN) 500 mg, oral, 2 times daily       Physical Exam:  General: Ill appearing, cooperative with exam, in NAD.  HEENT: Atraumatic. No erythema in posterior pharynx.  Lymph: No cervical or inguinal lymphadenopathy.  Cardiac: RRR. No murmurs.  Lungs: CTAB. Nl WOB.  Abd: Non-tender. No rebound or gaurding. Nl bowel sounds.  Ext: No edema. 2+ pulses.  Skin: No rashes, abrasions, or contusions.  Psych: A&Ox3. Nl affect.  Neuro: 5/5 strength. Sensation intact.     Assessment/Plan   1) NSTEMI  Plan for left heart cath today  Risks,benefits and alternatives of Cardiac Cath possible PTCA including risk of bleeding,stroke,renal failure were explained to patient and he/she agrees to proceed   Peripheral IV 11/08/24 20 G Left Antecubital (Active)   Site Assessment Clean;Dry;Intact 11/08/24 0900   Dressing Status Clean;Dry 11/08/24 0900   Number of days: 0       Peripheral IV 11/08/24 20 G Distal;Right;Upper Arm (Active)   Site Assessment Clean;Dry;Intact 11/08/24 0900   Dressing Status Clean;Dry 11/08/24 0900   Number of days: 0       Code Status:  Full Code    I spent 30 minutes in the professional and overall care of this patient.        Dami Romero MD

## 2024-11-08 NOTE — Clinical Note
Angioplasty of the proximal left anterior descending lesion. Inflation 1: Pressure = 12 kalyn; Duration = 10 sec. Inflation 2: Pressure = 14 kalyn; Duration = 8 sec.

## 2024-11-08 NOTE — PRE-SEDATION DOCUMENTATION
"Cardiology Preprocedure Note    Garett Stafford   Indication for procedure: The primary encounter diagnosis was Chest pain. Diagnoses of Chest pain, unspecified type and Elevated troponin were also pertinent to this visit. Patient here for [facility] r/o obstructive CAD.         /81   Pulse 59   Temp 36.2 °C (97.1 °F) (Tympanic)   Resp 16   Ht 1.778 m (5' 10\")   Wt 121 kg (266 lb 0.5 oz)   SpO2 100%   BMI 38.17 kg/m²    Relevant Labs:   Lab Results   Component Value Date    CREATININE 1.12 11/08/2024    EGFR 68 11/08/2024    INR 1.0 11/08/2024    PROTIME 11.5 11/08/2024       Planned Sedation/Anesthesia: Moderate    Airway assessment: normal    Directed physical examination: General: Alert and Oriented, No distress, cooperative. Lungs: Clear to auscultation bilaterally, no wheezes, rhonci, or rales. respirations unlabored Heart: regular rate and rhythm, S1 and S2, no murmur     Mallampati: II (hard and soft palate, upper portion of tonsils and uvula visible)    ASA Score: ASA 2 - Patient with mild systemic disease with no functional limitations    Benefits, risks and alternatives of procedure and planned sedation have been discussed with the patient and/or their representative. All questions answered and they agree to proceed.     Hansa Licona, APRN-CNP   "

## 2024-11-08 NOTE — CONSULTS
Consults  History Of Present Illness:    Garett Stafford is a 74 y.o. male  with hx of CAD s/p stents and hypertension presents with chest pain. Patient was in his normal state of health until about a week ago when he started experiencing pain in both armpits.  Was seen in the ED and workup negative.  Ultimately discharged home.  This evening he was in bed and experienced similar pain but pain also in chest, left neck, and left arm.  Much more severe.  Feels like pressure sensation.  Denies shortness of breath or cough.  Denies nausea or vomiting.  History of CAD s/p multiple stents most recently in 2020.  [facility]  In the ED, VSS.  Troponin 111.  EKG without ischemic changes.  Admitted to medicine.      Last Recorded Vitals:  Vitals:    11/08/24 0614 11/08/24 0634 11/08/24 0700 11/08/24 0843   BP:  127/70 124/72 130/82   BP Location:    Left arm   Patient Position:    Lying   Pulse:  60 59 60   Resp:  18 15 18   Temp:    35.7 °C (96.2 °F)   TempSrc:    Temporal   SpO2:  99% 95% 97%   Weight: 121 kg (266 lb 0.5 oz)      Height:           Last Labs:  CBC - 11/8/2024:  5:03 AM  6.8 13.2 180    39.8      CMP - 11/8/2024:  5:03 AM  8.9 6.6 16 --- 0.3   _ 4.1 12 69      PTT - 11/8/2024:  6:20 AM  1.0   11.5 30     Troponin I, High Sensitivity   Date/Time Value Ref Range Status   11/08/2024 05:03  (HH) 0 - 20 ng/L Final     Comment:     Previous result verified on 11/8/2024 0307 on specimen/case [id number] called with component TRPHS for procedure Troponin I, High Sensitivity, Initial with value 55 ng/L.   11/08/2024 03:30 AM 67 (HH) 0 - 20 ng/L Final     Comment:     Previous result verified on 11/8/2024 0307 on specimen/case [id number] called with component TRPHS for procedure Troponin I, High Sensitivity, Initial with value 55 ng/L.   11/08/2024 02:25 AM 55 (HH) 0 - 20 ng/L Final     BNP   Date/Time Value Ref Range Status   11/08/2024 02:25 AM 29 0 - 99 pg/mL Final  "  10/30/2024 10:35 AM 23 0 - 99 pg/mL Final     Hemoglobin A1C   Date/Time Value Ref Range Status   11/08/2024 02:25 AM 6.0 (H) See comment % Final     LDL Calculated   Date/Time Value Ref Range Status   11/08/2024 03:30  (H) <=99 mg/dL Final     Comment:                                 Near   Borderline      AGE      Desirable  Optimal    High     High     Very High     0-19 Y     0 - 109     ---    110-129   >/= 130     ----    20-24 Y     0 - 119     ---    120-159   >/= 160     ----      >24 Y     0 -  99   100-129  130-159   160-189     >/=190       VLDL   Date/Time Value Ref Range Status   11/08/2024 03:30 AM 24 0 - 40 mg/dL Final      Last I/O:  No intake/output data recorded.    Past Cardiology Tests (Last 3 Years):  EKG:  ECG 12 lead 10/30/2024 (Preliminary)    Echo:  No results found for this or any previous visit from the past 1095 days.    Ejection Fractions:  No results found for: \"EF\"  Cath:  No results found for this or any previous visit from the past 1095 days.    Stress Test:  No results found for this or any previous visit from the past 1095 days.    Cardiac Imaging:  No results found for this or any previous visit from the past 1095 days.      Past Medical History:  He has a past medical history of Aneurysm (CMS-[facility]), Hypertension, and Pacemaker.    Past Surgical History:  He has a past surgical history that includes Cardiac surgery; Appendectomy; Tonsillectomy; and Joint replacement.      Social History:  He reports that he has quit smoking. His smoking use included cigarettes. He has never used smokeless tobacco. He reports current alcohol use. He reports that he does not use drugs.    Family History:  No family history on file.     Allergies:  Colestipol, Demerol [meperidine], Lipitor [atorvastatin], Simvastatin, Augmentin [amoxicillin-pot clavulanate], and Adhes. band-tape-benzalkonium    Inpatient Medications:  Scheduled medications   Medication Dose Route Frequency    aspirin  81 mg " oral Daily     PRN medications   Medication    acetaminophen    Or    acetaminophen    Or    acetaminophen    alum-mag hydroxide-simeth    benzocaine-menthol    dextromethorphan-guaifenesin    guaiFENesin    heparin    melatonin    ondansetron    Or    ondansetron    polyethylene glycol     Continuous Medications   Medication Dose Last Rate    heparin  0-4,000 Units/hr 1,000 Units/hr (11/08/24 0639)     Outpatient Medications:  Current Outpatient Medications   Medication Instructions    methocarbamol (ROBAXIN) 500 mg, oral, 2 times daily       Physical Exam:  General: Ill appearing, cooperative with exam, in NAD.  HEENT: Atraumatic. No erythema in posterior pharynx.  Lymph: No cervical or inguinal lymphadenopathy.  Cardiac: RRR. No murmurs.  Lungs: CTAB. Nl WOB.  Abd: Non-tender. No rebound or gaurding. Nl bowel sounds.  Ext: No edema. 2+ pulses.  Skin: No rashes, abrasions, or contusions.  Psych: A&Ox3. Nl affect.  Neuro: 5/5 strength. Sensation intact.     Assessment/Plan   1) NSTEMI  Plan for left heart cath today  Risks,benefits and alternatives of Cardiac Cath possible PTCA including risk of bleeding,stroke,renal failure were explained to patient and he/she agrees to proceed   Peripheral IV 11/08/24 20 G Left Antecubital (Active)   Site Assessment Clean;Dry;Intact 11/08/24 0900   Dressing Status Clean;Dry 11/08/24 0900   Number of days: 0       Peripheral IV 11/08/24 20 G Distal;Right;Upper Arm (Active)   Site Assessment Clean;Dry;Intact 11/08/24 0900   Dressing Status Clean;Dry 11/08/24 0900   Number of days: 0       Code Status:  Full Code    I spent 30 minutes in the professional and overall care of this patient.        Dami Romero MD

## 2024-11-08 NOTE — CARE PLAN
The patient's goals for the shift include      The clinical goals for the shift include Pt will remain hemodynamically stable throughout shift.

## 2024-11-08 NOTE — POST-PROCEDURE NOTE
Physician Transition of Care Summary  Invasive Cardiovascular Lab    Procedure Date: 11/8/2024  Attending:    Dhaval Romero - Primary  Resident/Fellow/Other Assistant: Surgeons and Role:  * No surgeons found with a matching role *    Indications:   Pre-op Diagnosis      * Chest pain, unspecified type [R07.9]     * Elevated troponin [R79.89]    Post-procedure diagnosis:   Post-op Diagnosis     * Chest pain, unspecified type [R07.9]     * Elevated troponin [R79.89]    Procedure(s):   Left Heart Cath  35550 - NC L HRT CATH W/NJX L VENTRICULOGRAPHY IMG S&I    PCI    OCT (Optical Coherence Tomography)    FFR (Fractional Flow Reserve)    Procedure Findings:   Severe LAD stenosis 75-80%, RFR 0.81/positive in the LAD, occluded stents in the RCA. There is Left to right collaterals     Description of the Procedure:   Successful PCI of the LAD   LHC  RRA>TR band     Complications:   None     Stents/Implants:   Implants       Stent    Stent, [place] Rahat, 3.50 X 30rx - [id number] - Implanted        Inventory item: STENT, GERMAN FRONTIER RAHAT, 3.50 X 30RX Model/Cat number: [id number]    : [employer] Lot number: [id number]    Device identifier: [id number]        As of 11/8/2024       Status: Implanted                              Anticoagulation/Antiplatelet Plan:   Loaded with Brilinta 180 mg PO, Needs to continue DAPT for 1 year uninterrupted     Estimated Blood Loss:   5 mL    Anesthesia: Moderate Sedation Anesthesia Staff: No anesthesia staff entered.    Any Specimen(s) Removed:   No specimens collected during this procedure.    Disposition:   Floor       Electronically signed by: JOAQUIN Raines, 11/8/2024 3:01 PM

## 2024-11-09 ENCOUNTER — PHARMACY VISIT (OUTPATIENT)
Dept: PHARMACY | Facility: CLINIC | Age: 74
End: 2024-11-09
Payer: MEDICARE

## 2024-11-09 ENCOUNTER — APPOINTMENT (OUTPATIENT)
Dept: CARDIOLOGY | Facility: HOSPITAL | Age: 74
End: 2024-11-09
Payer: MEDICARE

## 2024-11-09 VITALS
DIASTOLIC BLOOD PRESSURE: 78 MMHG | OXYGEN SATURATION: 93 % | WEIGHT: 263.89 LBS | BODY MASS INDEX: 37.78 KG/M2 | HEART RATE: 62 BPM | HEIGHT: 70 IN | TEMPERATURE: 98.2 F | RESPIRATION RATE: 16 BRPM | SYSTOLIC BLOOD PRESSURE: 141 MMHG

## 2024-11-09 LAB
ALBUMIN SERPL BCP-MCNC: 3.9 G/DL (ref 3.4–5)
ALP SERPL-CCNC: 68 U/L (ref 33–136)
ALT SERPL W P-5'-P-CCNC: 12 U/L (ref 10–52)
ANION GAP SERPL CALC-SCNC: 11 MMOL/L (ref 10–20)
AST SERPL W P-5'-P-CCNC: 19 U/L (ref 9–39)
BILIRUB SERPL-MCNC: 0.6 MG/DL (ref 0–1.2)
BUN SERPL-MCNC: 13 MG/DL (ref 6–23)
CALCIUM SERPL-MCNC: 9 MG/DL (ref 8.6–10.3)
CHLORIDE SERPL-SCNC: 104 MMOL/L (ref 98–107)
CO2 SERPL-SCNC: 26 MMOL/L (ref 21–32)
CREAT SERPL-MCNC: 1.1 MG/DL (ref 0.5–1.3)
EGFRCR SERPLBLD CKD-EPI 2021: 70 ML/MIN/1.73M*2
ERYTHROCYTE [DISTWIDTH] IN BLOOD BY AUTOMATED COUNT: 13.3 % (ref 11.5–14.5)
GLUCOSE SERPL-MCNC: 134 MG/DL (ref 74–99)
HCT VFR BLD AUTO: 42.1 % (ref 41–52)
HGB BLD-MCNC: 14.1 G/DL (ref 13.5–17.5)
MCH RBC QN AUTO: 31.6 PG (ref 26–34)
MCHC RBC AUTO-ENTMCNC: 33.5 G/DL (ref 32–36)
MCV RBC AUTO: 94 FL (ref 80–100)
NRBC BLD-RTO: 0 /100 WBCS (ref 0–0)
PHOSPHATE SERPL-MCNC: 2.4 MG/DL (ref 2.5–4.9)
PLATELET # BLD AUTO: 185 X10*3/UL (ref 150–450)
POTASSIUM SERPL-SCNC: 3.8 MMOL/L (ref 3.5–5.3)
PROT SERPL-MCNC: 6.4 G/DL (ref 6.4–8.2)
RBC # BLD AUTO: 4.46 X10*6/UL (ref 4.5–5.9)
SODIUM SERPL-SCNC: 137 MMOL/L (ref 136–145)
WBC # BLD AUTO: 8.3 X10*3/UL (ref 4.4–11.3)

## 2024-11-09 PROCEDURE — 85027 COMPLETE CBC AUTOMATED: CPT | Performed by: STUDENT IN AN ORGANIZED HEALTH CARE EDUCATION/TRAINING PROGRAM

## 2024-11-09 PROCEDURE — 2500000001 HC RX 250 WO HCPCS SELF ADMINISTERED DRUGS (ALT 637 FOR MEDICARE OP): Performed by: INTERNAL MEDICINE

## 2024-11-09 PROCEDURE — 36415 COLL VENOUS BLD VENIPUNCTURE: CPT | Performed by: STUDENT IN AN ORGANIZED HEALTH CARE EDUCATION/TRAINING PROGRAM

## 2024-11-09 PROCEDURE — 99239 HOSP IP/OBS DSCHRG MGMT >30: CPT | Performed by: INTERNAL MEDICINE

## 2024-11-09 PROCEDURE — 93005 ELECTROCARDIOGRAM TRACING: CPT

## 2024-11-09 PROCEDURE — 84100 ASSAY OF PHOSPHORUS: CPT | Performed by: NURSE PRACTITIONER

## 2024-11-09 PROCEDURE — RXMED WILLOW AMBULATORY MEDICATION CHARGE

## 2024-11-09 PROCEDURE — 2500000002 HC RX 250 W HCPCS SELF ADMINISTERED DRUGS (ALT 637 FOR MEDICARE OP, ALT 636 FOR OP/ED): Performed by: NURSE PRACTITIONER

## 2024-11-09 PROCEDURE — 99231 SBSQ HOSP IP/OBS SF/LOW 25: CPT | Performed by: NURSE PRACTITIONER

## 2024-11-09 PROCEDURE — 2500000002 HC RX 250 W HCPCS SELF ADMINISTERED DRUGS (ALT 637 FOR MEDICARE OP, ALT 636 FOR OP/ED): Performed by: STUDENT IN AN ORGANIZED HEALTH CARE EDUCATION/TRAINING PROGRAM

## 2024-11-09 PROCEDURE — 2500000001 HC RX 250 WO HCPCS SELF ADMINISTERED DRUGS (ALT 637 FOR MEDICARE OP): Performed by: STUDENT IN AN ORGANIZED HEALTH CARE EDUCATION/TRAINING PROGRAM

## 2024-11-09 PROCEDURE — 80053 COMPREHEN METABOLIC PANEL: CPT | Performed by: STUDENT IN AN ORGANIZED HEALTH CARE EDUCATION/TRAINING PROGRAM

## 2024-11-09 RX ORDER — CARVEDILOL 6.25 MG/1
6.25 TABLET ORAL 2 TIMES DAILY
Status: DISCONTINUED | OUTPATIENT
Start: 2024-11-10 | End: 2024-11-09 | Stop reason: HOSPADM

## 2024-11-09 RX ORDER — NAPROXEN SODIUM 220 MG/1
81 TABLET, FILM COATED ORAL DAILY
Qty: 30 TABLET | Refills: 0 | Status: SHIPPED | OUTPATIENT
Start: 2024-11-10

## 2024-11-09 RX ORDER — CARVEDILOL 6.25 MG/1
6.25 TABLET ORAL 2 TIMES DAILY
Qty: 60 TABLET | Refills: 0 | Status: SHIPPED | OUTPATIENT
Start: 2024-11-10

## 2024-11-09 RX ORDER — ACETAMINOPHEN 325 MG/1
650 TABLET ORAL EVERY 4 HOURS PRN
Qty: 30 TABLET | Refills: 0 | Status: SHIPPED | OUTPATIENT
Start: 2024-11-09

## 2024-11-09 ASSESSMENT — COGNITIVE AND FUNCTIONAL STATUS - GENERAL
DAILY ACTIVITIY SCORE: 24
MOBILITY SCORE: 24

## 2024-11-09 ASSESSMENT — PAIN SCALES - GENERAL
PAINLEVEL_OUTOF10: 0 - NO PAIN
PAINLEVEL_OUTOF10: 0 - NO PAIN

## 2024-11-09 ASSESSMENT — PAIN - FUNCTIONAL ASSESSMENT
PAIN_FUNCTIONAL_ASSESSMENT: 0-10
PAIN_FUNCTIONAL_ASSESSMENT: 0-10

## 2024-11-09 NOTE — CARE PLAN
The patient's goals for the shift include      The clinical goals for the shift include to remain hemodynamically stable throughout the shift      Problem: Pain  Goal: Takes deep breaths with improved pain control throughout the shift  Outcome: Met  Goal: Turns in bed with improved pain control throughout the shift  Outcome: Met  Goal: Walks with improved pain control throughout the shift  Outcome: Met  Goal: Performs ADL's with improved pain control throughout shift  Outcome: Met  Goal: Participates in PT with improved pain control throughout the shift  Outcome: Met  Goal: Free from opioid side effects throughout the shift  Outcome: Met  Goal: Free from acute confusion related to pain meds throughout the shift  Outcome: Met     Problem: Pain - Adult  Goal: Verbalizes/displays adequate comfort level or baseline comfort level  Outcome: Met     Problem: Safety - Adult  Goal: Free from fall injury  Outcome: Met     Problem: Discharge Planning  Goal: Discharge to home or other facility with appropriate resources  Outcome: Met     Problem: Chronic Conditions and Co-morbidities  Goal: Patient's chronic conditions and co-morbidity symptoms are monitored and maintained or improved  Outcome: Met

## 2024-11-09 NOTE — DISCHARGE SUMMARY
Discharge Diagnosis    NSTMI-severe LAD 75 to 85% lesion stented 11/8/2024  Known CAD  HTN  Hypothyroidism        Issues Requiring Follow-Up  Aspirin 81 mg daily and Brilinta 90 mg twice a day for 1 year at least.  Outpatient follow-up with cardiology in the next 7 days.       Discharge Meds     Medication List      START taking these medications     acetaminophen 325 mg tablet; Commonly known as: Tylenol; Take 2 tablets   (650 mg) by mouth every 4 hours if needed for mild pain (1 - 3), moderate   pain (4 - 6), headaches or fever (temp greater than 38.0 C).   aspirin 81 mg chewable tablet; Chew 1 tablet (81 mg) once daily.; Start   taking on: November 10, 2024   carvedilol 6.25 mg tablet; Commonly known as: Coreg; Take 1 tablet (6.25   mg) by mouth 2 times a day. Do not fill before November 10, 2024.; Start   taking on: November 10, 2024   ticagrelor 90 mg tablet; Commonly known as: Brilinta; Take 1 tablet (90   mg) by mouth 2 times a day.     CONTINUE taking these medications     levothyroxine 50 mcg tablet; Commonly known as: Synthroid, Levoxyl   lisinopril 10 mg tablet 10 mg, hydroCHLOROthiazide 12.5 mg tablet 12.5   mg     STOP taking these medications     atenolol 50 mg tablet; Commonly known as: Tenormin   methocarbamol 500 mg tablet; Commonly known as: Robaxin       Test Results Pending At Discharge  Pending Labs       No current pending labs.            Hospital Course   Garett Stafford is a 74 y.o. male on day 1 of admission presenting with Chest pain.           Subjective  Garett Stafford is a 74 y.o. with hx of CAD s/p stents and hypertension presents with chest pain. Patient was in his normal state of health until about a week ago when he started experiencing pain in both armpits.  Was seen in the ED and workup negative.  Ultimately discharged home.  This evening he was in bed and experienced similar pain but pain also in chest, left neck, and left arm.  Much more severe.  Feels like pressure sensation.   Denies shortness of breath or cough.  Denies nausea or vomiting.  History of CAD s/p multiple stents most recently in 2020.  [facility]  In the ED, VSS.  Troponin 111.  EKG without ischemic changes.  Admitted to medicine.     Review of Systems      A Comprehensive greater than 10 system review of systems was carried out.  Pertinent positives and negatives are noted above.  Otherwise negative for contributory information.      Past Medical History      Medical History           Past Medical History:   Diagnosis Date    Aneurysm (CMS-HCC) Continue         Hypertension     Pacemaker          11/8: Presented with chest pain and admitted for NSTMI on heparin drip.  Cardiology consulted and plan for cardiac cath today.  No chest pain at present.      11/9: Patient had cardiac cath yesterday he had a PCI of the LAD cardiology is changes beta-blocker to carvedilol 6.25 twice daily.  Patient will need dual antiplatelet therapy.  Okay to discharge home today follow-up with Dr. Romero in 1 week.     See after visit summary for complete plan     35 minutes spent in the care of    Pertinent Physical Exam At Time of Discharge  Physical Exam  See today's progress note for more physical exam findings.  Outpatient Follow-Up  No future appointments.      Jase Alanis MD

## 2024-11-09 NOTE — PROGRESS NOTES
History Of Present Illness:    Garett Stafford is a 74 y.o. male  with hx of CAD s/p stents and hypertension presents with chest pain. Patient was in his normal state of health until about a week ago when he started experiencing pain in both armpits.  Was seen in the ED and workup negative.  Ultimately discharged home.  This evening he was in bed and experienced similar pain but pain also in chest, left neck, and left arm.  Much more severe.  Feels like pressure sensation.  Denies shortness of breath or cough.  Denies nausea or vomiting.  History of CAD s/p multiple stents most recently in 2020.  [facility]  In the ED, VSS.  Troponin 111.  EKG without ischemic changes.  Admitted to medicine.   Subjective Data:  Patient denies chest pain or palpitations.  He is not lightheaded when up to the bathroom.  Telemetry shows paced rhythm.  He does not have any complications at the right wrist site.  He tells me that he has significant reaction to statin medications and that he loses the use of one of his arms.  He has been on 2 different statins that are documented in the chart with same reaction.    Overnight Events:    No complications overnight     Objective Data:  Last Recorded Vitals:  Vitals:    11/08/24 2053 11/09/24 0007 11/09/24 0451 11/09/24 0756   BP: 151/82 116/70 135/72 (!) 152/91   BP Location:    Left arm   Patient Position: Lying Lying Lying Lying   Pulse: 63 77 78 84   Resp: 18 18 18 16   Temp: 36.8 °C (98.3 °F) 36.7 °C (98 °F) 36.7 °C (98 °F) 36.4 °C (97.6 °F)   TempSrc: Temporal Temporal Temporal Temporal   SpO2: 94% 95% 95% 96%   Weight:   120 kg (263 lb 14.3 oz)    Height:           Last Labs:  CBC - 11/9/2024:  8:17 AM  8.3 14.1 185    42.1      CMP - 11/9/2024:  8:17 AM  9.0 6.4 19 --- 0.6   2.4 3.9 12 68      PTT - 11/8/2024:  6:20 AM  1.0   11.5 30     Results from last 7 days   Lab Units 11/09/24  0817 11/08/24  0503 11/08/24  0225   SODIUM mmol/L 137 138 137    POTASSIUM mmol/L 3.8 3.9 3.6   CHLORIDE mmol/L 104 103 102   CO2 mmol/L 26 29 29   BUN mg/dL 13 21 21   CREATININE mg/dL 1.10 1.12 1.15   GLUCOSE mg/dL 134* 105* 107*   CALCIUM mg/dL 9.0 8.9 9.2      Results from last 7 days   Lab Units 11/09/24  0817 11/08/24  0503 11/08/24  0225   WBC AUTO x10*3/uL 8.3 6.8 6.6   HEMOGLOBIN g/dL 14.1 13.2* 13.9   HEMATOCRIT % 42.1 39.8* 41.5   PLATELETS AUTO x10*3/uL 185 180 177      Results from last 7 days   Lab Units 11/08/24  0225   MAGNESIUM mg/dL 1.92      TROPHS   Date/Time Value Ref Range Status   11/08/2024 10:44  0 - 20 ng/L Final     Comment:     Previous result verified on 11/8/2024 0307 on specimen/case [id number] called with component [employer] for procedure Troponin I, High Sensitivity, Initial with value 55 ng/L.   11/08/2024 05:03  0 - 20 ng/L Final     Comment:     Previous result verified on 11/8/2024 0307 on specimen/case [id number] called with component [employer] for procedure Troponin I, High Sensitivity, Initial with value 55 ng/L.   11/08/2024 03:30 AM 67 0 - 20 ng/L Final     Comment:     Previous result verified on 11/8/2024 0307 on specimen/case [id number] called with component [employer] for procedure Troponin I, High Sensitivity, Initial with value 55 ng/L.     BNP   Date/Time Value Ref Range Status   11/08/2024 02:25 AM 29 0 - 99 pg/mL Final   10/30/2024 10:35 AM 23 0 - 99 pg/mL Final     HGBA1C   Date/Time Value Ref Range Status   11/08/2024 02:25 AM 6.0 See comment % Final     LDLCALC   Date/Time Value Ref Range Status   11/08/2024 03:30  <=99 mg/dL Final     Comment:                                 Near   Borderline      AGE      Desirable  Optimal    High     High     Very High     0-19 Y     0 - 109     ---    110-129   >/= 130     ----    20-24 Y     0 - 119     ---    120-159   >/= 160     ----      >24 Y     0 -  99   100-129  130-159   160-189     >/=190       VLDL   Date/Time Value Ref Range Status   11/08/2024 03:30 AM  24 0 - 40 mg/dL Final      Last I/O:  I/O last 3 completed shifts:  In: 781.7 (6.5 mL/kg) [P.O.:720; I.V.:61.7 (0.5 mL/kg)]  Out: 205 (1.7 mL/kg) [Urine:200 (0 mL/kg/hr); Blood:5]  Weight: 119.7 kg     Past Cardiology Tests (Last 3 Years):  EKG:  Electrocardiogram, 12-lead PRN ACS symptoms 11/08/2024 (Preliminary)      ECG 12 lead 11/08/2024 (Preliminary)      ECG 12 lead 11/08/2024 (Preliminary)      ECG 12 lead 10/30/2024 (Preliminary)    Echo:  Transthoracic Echo (TTE) Complete 11/08/2024  CONCLUSIONS:   1. Poorly visualized anatomical structures due to suboptimal image quality.   2. The left ventricular systolic function is low normal, with a visually estimated ejection fraction of 50-55%.   3. Entire anterior septum, apical lateral segment, and apex are abnormal.   4. Left ventricular diastolic filling was indeterminate.   5. Left ventricular cavity size is mildly dilated.   6. There is normal right ventricular global systolic function.   7. Moderately elevated right ventricular systolic pressure.   8. Aortic valve stenosis is not present.   9. Mild aortic valve regurgitation.  10. There is moderately increased septal thickness.  Ejection Fractions:  EF   Date/Time Value Ref Range Status   11/08/2024 03:47 PM 53 %      Cath:  11/8/24 [facility]  Procedure Findings:   Severe LAD stenosis 75-80%, RFR 0.81/positive in the LAD, occluded stents in the RCA. There is Left to right collaterals      Description of the Procedure:   Successful PCI of the LAD   [facility]  RRA>TR band   Stress Test:  No results found for this or any previous visit from the past 1095 days.    Cardiac Imaging:  No results found for this or any previous visit from the past 1095 days.      Inpatient Medications:  Scheduled medications   Medication Dose Route Frequency    aspirin  81 mg oral Daily    atenolol  50 mg oral Daily    lisinopril  10 mg oral Daily    And    hydroCHLOROthiazide  12.5 mg oral Daily    levothyroxine  50 mcg oral Daily    ticagrelor   90 mg oral BID     PRN medications   Medication    acetaminophen    Or    acetaminophen    Or    acetaminophen    alum-mag hydroxide-simeth    benzocaine-menthol    dextromethorphan-guaifenesin    guaiFENesin    heparin    melatonin    morphine    ondansetron    Or    ondansetron    oxygen    polyethylene glycol     Continuous Medications   Medication Dose Last Rate    heparin  0-4,000 Units/hr Stopped (11/08/24 1243)       Physical Exam:  Alert and oriented no acute distress  No JVD or vaginal reflux  Lungs are clear to auscultation bilaterally  S1-S2 is regular no murmur telemetry shows paced rhythm  Abdomen is obese with normal bowel sounds soft to palpation.  No pitting edema of the lower extremities.  Moves all extremities well.  Right wrist cath site is without complication radial and ulnar pulses are palpable.     Assessment/Plan   1) NSTEMI  Plan for left heart cath today  Risks,benefits and alternatives of Cardiac Cath possible PTCA including risk of bleeding,stroke,renal failure were explained to patient and he/she agrees to proceed   11/9: No chest pain today he has ventricularly paced rhythm.  He is status post PCI of the LAD.  Pressure is variable.  I think he would be better served by changing the beta-blocker to carvedilol 6.25 mg twice a day.  This can be further titrated in the outpatient space.  Will continue the Prinzide, aspirin 81 mg daily, and Brilinta 90 mg twice a day.  Home today.  He will have follow-up in the cardiology office with Dr. Romero in 1 week.    2.HTN:   variable.  See above.                                  Code Status:  Full Code    I spent 15 minutes in the professional and overall care of this patient.        Julia Mcdonald, APRN-CNP

## 2024-11-09 NOTE — PROGRESS NOTES
Garett Stafford is a 74 y.o. male on day 1 of admission presenting with Chest pain.      Subjective     Garett Stafford is a 74 y.o. with hx of CAD s/p stents and hypertension presents with chest pain. Patient was in his normal state of health until about a week ago when he started experiencing pain in both armpits.  Was seen in the ED and workup negative.  Ultimately discharged home.  This evening he was in bed and experienced similar pain but pain also in chest, left neck, and left arm.  Much more severe.  Feels like pressure sensation.  Denies shortness of breath or cough.  Denies nausea or vomiting.  History of CAD s/p multiple stents most recently in 2020.  [facility]  In the ED, VSS.  Troponin 111.  EKG without ischemic changes.  Admitted to medicine.     Review of Systems      A Comprehensive greater than 10 system review of systems was carried out.  Pertinent positives and negatives are noted above.  Otherwise negative for contributory information.      Past Medical History      Medical History        Past Medical History:   Diagnosis Date    Aneurysm (CMS-HCC)       low back per pt    Hypertension      Pacemaker          11/8: Presented with chest pain and admitted for NSTMI on heparin drip.  Cardiology consulted and plan for cardiac cath today.  No chest pain at present.     11/9: Patient had cardiac cath yesterday he had a PCI of the LAD cardiology is changes beta-blocker to carvedilol 6.25 twice daily.  Patient will need dual antiplatelet therapy.  Okay to discharge home today follow-up with Dr. Romero in 1 week.    See after visit summary for complete plan    35 minutes spent in the care of this patient.       Objective     Last Recorded Vitals  /78 (BP Location: Left arm, Patient Position: Lying)   Pulse 62   Temp 36.8 °C (98.2 °F) (Temporal)   Resp 16   Wt 120 kg (263 lb 14.3 oz)   SpO2 93%   Intake/Output last 3 Shifts:    Intake/Output Summary (Last 24 hours)  at 11/9/2024 1204  Last data filed at 11/9/2024 0250  Gross per 24 hour   Intake 781.67 ml   Output 205 ml   Net 576.67 ml       Admission Weight  Weight: 118 kg (260 lb) (11/08/24 0213)    Daily Weight  11/09/24 : 120 kg (263 lb 14.3 oz)    Image Results  Transthoracic Echo (TTE) [place] [address]       Phone [phone] Fax [phone]    TRANSTHORACIC ECHOCARDIOGRAM REPORT    Patient Name:       MICHAEL Greenwood Physician:    [id number] Jurgen Rose MD  Study Date:         11/8/2024            Ordering Provider:    [id number] SHANNA THOMSON  MRN/PID:            [id number]             Fellow:  Accession#:         [id number]         Nurse:                Norma Dolan RN  Date of Birth/Age:  1950 / 74 years Sonographer:          Leanne Murphy RDCS  Gender Assigned at                      Additional Staff:  Birth:  Height:             177.80 cm            Admit Date:           11/8/2024  Weight:             120.66 kg            Admission Status:     Inpatient -                                                                 Routine  BSA / BMI:          2.36 m2 / 38.17      [facility]                      kg/m2  Blood Pressure: 166 /89 mmHg    Study Type:    TRANSTHORACIC ECHO (TTE) COMPLETE  Diagnosis/ICD: Non ST elevation (NSTEMI) myocardial infarction-I21.4  Indication:    Acute Coronary Syndrome: Non-STEMI  CPT Codes:     Echo Complete w Full [id number]    Patient History:  Pertinent History: CAD and Chest Pain.    Study Detail: The following Echo studies were performed: 2D, M-Mode, Doppler and                color flow. Technically challenging study due to body habitus,                prominent lung artifact and poor acoustic windows. Optison used as                a contrast  agent for endocardial border definition. Total contrast                used for this procedure was 5 mL via IV push.       PHYSICIAN INTERPRETATION:  Left Ventricle: The left ventricular systolic function is low normal, with a visually estimated ejection fraction of 50-55%. There are no regional wall motion abnormalities. The left ventricular cavity size is mildly dilated. There is moderately increased septal and mildly increased posterior left ventricular wall thickness. There is left ventricular concentric remodeling. Abnormal (paradoxical) septal motion, consistent with an intraventricular conduction delay. Left ventricular diastolic filling was indeterminate.  LV Wall Scoring:  The entire anterior septum, apical lateral segment, and apex are hypokinetic.    Left Atrium: The left atrium is normal in size.  Right Ventricle: The right ventricle is upper limits of normal in size. There is normal right ventricular global systolic function. A device is visualized in the right ventricle.  Right Atrium: The right atrium is normal in size. There is a device visualized in the right atrium.  Aortic Valve: The aortic valve was not well visualized. There is minimal aortic valve cusp calcification. There is no evidence of aortic valve stenosis.  There is mild aortic valve regurgitation.  Mitral Valve: The mitral valve is normal in structure. There is trace mitral valve regurgitation.  Tricuspid Valve: The tricuspid valve is structurally normal. There is trace tricuspid regurgitation. The Doppler estimated RVSP is moderately elevated right ventricular systolic pressure at 48.2 mmHg.  Pulmonic Valve: The pulmonic valve is structurally normal. There is no indication of pulmonic valve regurgitation.  Pericardium: Trivial pericardial effusion.  Aorta: The aortic root is abnormal. There is mild dilatation of the ascending aorta. There is mild dilatation of the aortic root.  Systemic Veins: The inferior vena cava was not well  visualized.       CONCLUSIONS:   1. Poorly visualized anatomical structures due to suboptimal image quality.   2. The left ventricular systolic function is low normal, with a visually estimated ejection fraction of 50-55%.   3. Entire anterior septum, apical lateral segment, and apex are abnormal.   4. Left ventricular diastolic filling was indeterminate.   5. Left ventricular cavity size is mildly dilated.   6. There is normal right ventricular global systolic function.   7. Moderately elevated right ventricular systolic pressure.   8. Aortic valve stenosis is not present.   9. Mild aortic valve regurgitation.  10. There is moderately increased septal thickness.    QUANTITATIVE DATA SUMMARY:     2D MEASUREMENTS:            Normal Ranges:  Ao Root d:       3.90 cm    (2.0-3.7cm)  IVSd:            1.50 cm    (0.6-1.1cm)  LVPWd:           1.10 cm    (0.6-1.1cm)  LVIDd:           4.90 cm    (3.9-5.9cm)  LVIDs:           3.90 cm  LV Mass Index:   107.7 g/m2  LV % FS          20.4 %       LA VOLUME:                    Normal Ranges:  LA Vol A4C:        54.8 ml    (22+/-6mL/m2)  LA Vol A2C:        63.0 ml  LA Vol BP:         63.0 ml  LA Vol Index A4C:  23.3ml/m2  LA Vol Index A2C:  26.7 ml/m2  LA Vol Index BP:   26.7 ml/m2  LA Area A4C:       20.0 cm2  LA Area A2C:       20.0 cm2  LA Major Axis A4C: 6.2 cm  LA Major Axis A2C: 5.4 cm  LA Volume Index:   25.0 ml/m2       RA VOLUME BY A/L METHOD:          Normal Ranges:  RA Area A4C:             16.8 cm2       M-MODE MEASUREMENTS:         Normal Ranges:  Ao Root:             3.50 cm (2.0-3.7cm)  AoV Exc:             2.50 cm (1.5-2.5cm)  LAs:                 4.10 cm (2.7-4.0cm)       AORTA MEASUREMENTS:         Normal Ranges:  AoV Exc:            2.50 cm (1.5-2.5cm)  Ao Sinus, d:        3.90 cm (2.1-3.5cm)  Asc Ao, d:          4.00 cm (2.1-3.4cm)       LV SYSTOLIC FUNCTION BY 2D PLANIMETRY (MOD):                       Normal Ranges:  EF-A4C View:    59 % (>=55%)  EF-A2C View:     53 %  EF-Biplane:     57 %  EF-Visual:      53 %  LV EF Reported: 53 %       LV DIASTOLIC FUNCTION:             Normal Ranges:  MV Peak E:             0.77 m/s    (0.7-1.2 m/s)  MV Peak A:             0.78 m/s    (0.42-0.7 m/s)  E/A Ratio:             0.99        (1.0-2.2)  MV e'                  0.077 m/s   (>8.0)  MV lateral e'          0.10 m/s  MV medial e'           0.05 m/s  MV A Dur:              106.00 msec  E/e' Ratio:            10.06       (<8.0)  LV IVRT:               141 msec    (<100ms)       MITRAL VALVE:          Normal Ranges:  MV DT:        184 msec (150-240msec)       AORTIC VALVE:           Normal Ranges:  LVOT Max Rolando:  0.89 m/s (<=1.1m/s)  LVOT VTI:      22.10 cm  LVOT Diameter: 2.00 cm  (1.8-2.4cm)       AORTIC INSUFFICIENCY:  AI Vmax:       3.30 m/s  AI Half-time:  924 msec  AI Decel Rate: 105.00 cm/s2       RIGHT VENTRICLE:  RV Basal 4.30 cm  RV Mid   3.00 cm  RV Major 6.4 cm  TAPSE:   20.3 mm  RV s'    0.11 m/s       TRICUSPID VALVE/RVSP:          Normal Ranges:  Peak TR Velocity:     3.36 m/s  RV Syst Pressure:     48 mmHg  (< 30mmHg)  TV E Vmax:            0.39 m/s (0.3-0.7m/s)       PULMONIC VALVE:          Normal Ranges:  PV Max Rolando:     0.7 m/s  (0.6-0.9m/s)  PV Max P.1 mmHg       [id number] Jurgen Rose MD  Electronically signed on 2024 at 5:31:44 PM       Wall Scoring       ** Final **  Electrocardiogram, 12-lead PRN ACS symptoms  Ventricular-paced rhythm  No further analysis attempted due to paced rhythm  ECG 12 lead  Ventricular-paced rhythm  No further analysis attempted due to paced rhythm  ECG 12 lead  Ventricular-paced rhythm  No further analysis attempted due to paced rhythm  XR chest 1 view  Narrative: Interpreted By:  Alexis Saldivar,   STUDY:  XR CHEST 1 VIEW;  2024 2:39 am      INDICATION:  Signs/Symptoms:cp.          COMPARISON:  2021.      ACCESSION NUMBER(S):  [id number]      ORDERING CLINICIAN:  HUSSEIN CHAVIRA      FINDINGS:  AP radiograph of the  chest was provided.      Limited slightly by soft tissue attenuation factors and AP technique.  Apical lordosis.      Stably configured cardiac pacing device.      CARDIOMEDIASTINAL SILHOUETTE:  Cardiac silhouette is normal size. Tortuous thoracic aorta.      LUNGS:  No focal consolidation, sizeable pleural effusion, or pneumothorax.      ABDOMEN:  No remarkable upper abdominal findings.      BONES:  No acute osseous changes.      Impression: 1.  No definite evidence of acute cardiopulmonary process.              MACRO:  None      Signed by: Alexis Saldivar 11/8/2024 2:45 AM  Dictation workstation:   [id number]      Physical Exam  Constitutional:       Appearance: Normal appearance.   Cardiovascular:      Rate and Rhythm: Normal rate and regular rhythm.      Heart sounds: Normal heart sounds.   Pulmonary:      Effort: Pulmonary effort is normal.      Breath sounds: Normal breath sounds.   Abdominal:      General: Bowel sounds are normal.      Palpations: Abdomen is soft.   Musculoskeletal:      Right lower leg: No edema.      Left lower leg: No edema.   Neurological:      General: No focal deficit present.      Mental Status: He is alert and oriented to person, place, and time.         Relevant Results               Assessment/Plan   This patient currently has cardiac telemetry ordered; if you would like to modify or discontinue the telemetry order, click here to go to the orders activity to modify/discontinue the order.    NSTMI-severe LAD 75 to 85% lesion stented 11/8/2024  Known CAD  HTN  Hypothyroidism     Baby aspirin and Brilinta  Coreg 6.25 p.o. twice daily  Resume lisinopril hydrochlorothiazide  Outpatient follow-up with cardiology in a week  See after visit summary for complete plan.                            Jase Alanis MD

## 2024-11-09 NOTE — CARE PLAN
The patient's goals for the shift include      The clinical goals for the shift include Pt will remain pain free throughout shift.

## 2024-11-12 NOTE — ED PROVIDER NOTES
Garett Stafford is a 74 y.o. patient presenting to the ED for chest pain. He states that since this afternoon he has been have intermittent pain in his bilateral upper chest radiating to his right neck. The pain gets worse with activity, nothing makes it better. He was seen recently for similar pain that was only in his bilateral armpits/lateral chest, but felt similar and also radiated to his neck. No shortness of breath or other associated symptoms.     Additional History Obtained from: wife at bedside  Limitations to History: none  ------------------------------------------------------------------------------------------------------------------------------------------  Physical Exam:  Appearance: Alert, cooperative.  Skin: Warm, dry, appropriate color for ethnicity.  Eyes: Cornea clear. No scleral icterus or injection.   ENT: Mucous membranes moist.  Pulmonary: No accessory muscle use or stridor. Clear lung sounds bilaterally without rhonchi or wheezing.   Cardiac: Heart sounds regular without murmur. B/L radial and posterior tibial pulses full and symmetric.   Abdomen: Soft, not tender.  No rebound or guarding.   Musculoskeletal: No gross deformities.   Neurological: Face symmetrical. Voice clear. Appropriately conversant.   Psychiatric: Appropriate mood and affect.    Medical Decision Making:  Chronic Medical Conditions Significantly Affecting Care:  has a past medical history of Aneurysm (CMS-HCC), Coronary artery disease, Hypertension, and Pacemaker.    Social Determinants of Health Significantly Affecting Care: none identified    Differential Diagnosis Considered but not limited to: ACS, aortic pathology is considered, but given equal pulses, lack of progression of pain, and recent normal imaging is less likely. PERC neg without shortness of breath to suggest PE.      External Records Reviewed:   I reviewed recent and relevant outside records including:     Independent Interpretation of Studies: The following  studies were ordered as part of the emergency department work up and independently interpreted by me. See ED Course for details.    CBC, CMP, magnesium are within normal limits. Tropon is mildly elevated at 55 with repeat of 67. BNP normal.     CXR without any acute findings.     EKG with ventricular paced rhythm at rate of 60. Good capture. Does not meet Sgarbosa criteria. No STEMI.     Patient denies any chest pain at my evaluation, but does have neck pain. He was treated with SL nitroglycerin and did have resolution with 2 doses.     I did recommend admission and the patient agrees. I discussed with Dr. Cast who will admit the patient for further care.     Diagnoses as of 11/12/24 1719   Chest pain, unspecified type   Elevated troponin   Chest pain            Marcy Leonardo DO  11/12/24 1728

## 2024-11-13 PROBLEM — I35.1 AORTIC VALVE REGURGITATION: Status: ACTIVE | Noted: 2024-11-13

## 2024-11-13 PROBLEM — Z95.0 PACEMAKER: Status: ACTIVE | Noted: 2024-11-13

## 2024-11-13 PROBLEM — I10 HYPERTENSION: Status: ACTIVE | Noted: 2024-11-13

## 2024-11-13 PROBLEM — E78.5 DYSLIPIDEMIA: Status: ACTIVE | Noted: 2024-11-13

## 2024-11-13 PROBLEM — I25.10 CAD IN NATIVE ARTERY: Status: ACTIVE | Noted: 2024-11-13

## 2024-11-13 LAB
ATRIAL RATE: 60 BPM
P AXIS: 37 DEGREES
PR INTERVAL: 182 MS
Q ONSET: 249 MS
QRS COUNT: 9 BEATS
QRS DURATION: 194 MS
QT INTERVAL: 488 MS
QTC CALCULATION(BAZETT): 488 MS
QTC FREDERICIA: 488 MS
R AXIS: -80 DEGREES
T AXIS: 61 DEGREES
T OFFSET: 493 MS
VENTRICULAR RATE: 60 BPM

## 2024-11-13 RX ORDER — LISINOPRIL AND HYDROCHLOROTHIAZIDE 10; 12.5 MG/1; MG/1
1 TABLET ORAL DAILY
COMMUNITY
Start: 2021-12-01

## 2024-11-13 ASSESSMENT — ENCOUNTER SYMPTOMS
PALPITATIONS: 0
WHEEZING: 0
NEAR-SYNCOPE: 0
SHORTNESS OF BREATH: 0
CHILLS: 0
HEMATOCHEZIA: 0
HEMATURIA: 0
FEVER: 0
DYSPNEA ON EXERTION: 0
NAUSEA: 0
ALTERED MENTAL STATUS: 0
IRREGULAR HEARTBEAT: 0
VOMITING: 0
SYNCOPE: 0
ORTHOPNEA: 0
COUGH: 0
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
## 2024-11-13 NOTE — PATIENT INSTRUCTIONS
Recommend Mediterranean style of eating  Continue current medications  Recommend cardiac rehab  Follow-up with Dr. Romero in 3 months  If you have any questions or cardiac concerns, please call our office at [phone].

## 2024-11-15 ENCOUNTER — TELEPHONE (OUTPATIENT)
Dept: CARDIOLOGY | Facility: HOSPITAL | Age: 74
End: 2024-11-15
Payer: MEDICARE

## 2024-11-16 LAB
ATRIAL RATE: 0 BPM
P AXIS: 0 DEGREES
P AXIS: 17 DEGREES
P AXIS: 71 DEGREES
PR INTERVAL: 155 MS
PR INTERVAL: 186 MS
PR INTERVAL: 61 MS
Q ONSET: 251 MS
Q ONSET: 251 MS
Q ONSET: 252 MS
QRS COUNT: 10 BEATS
QRS COUNT: 9 BEATS
QRS COUNT: 9 BEATS
QRS DURATION: 188 MS
QRS DURATION: 191 MS
QRS DURATION: 196 MS
QT INTERVAL: 518 MS
QT INTERVAL: 522 MS
QT INTERVAL: 530 MS
QTC CALCULATION(BAZETT): 518 MS
QTC CALCULATION(BAZETT): 522 MS
QTC CALCULATION(BAZETT): 530 MS
QTC FREDERICIA: 518 MS
QTC FREDERICIA: 522 MS
QTC FREDERICIA: 530 MS
R AXIS: -52 DEGREES
R AXIS: -52 DEGREES
R AXIS: -53 DEGREES
T AXIS: 64 DEGREES
T AXIS: 66 DEGREES
T AXIS: 70 DEGREES
T OFFSET: 510 MS
T OFFSET: 512 MS
T OFFSET: 517 MS
VENTRICULAR RATE: 60 BPM

## 2024-11-18 ENCOUNTER — OFFICE VISIT (OUTPATIENT)
Dept: CARDIOLOGY | Facility: HOSPITAL | Age: 74
End: 2024-11-18
Payer: MEDICARE

## 2024-11-18 VITALS
HEART RATE: 83 BPM | SYSTOLIC BLOOD PRESSURE: 118 MMHG | WEIGHT: 267.8 LBS | DIASTOLIC BLOOD PRESSURE: 78 MMHG | BODY MASS INDEX: 38.34 KG/M2 | HEIGHT: 70 IN

## 2024-11-18 DIAGNOSIS — Z95.5 STATUS POST INSERTION OF DRUG ELUTING CORONARY ARTERY STENT: ICD-10-CM

## 2024-11-18 DIAGNOSIS — I25.10 CAD IN NATIVE ARTERY: Primary | ICD-10-CM

## 2024-11-18 DIAGNOSIS — I35.1 AORTIC VALVE INSUFFICIENCY, ETIOLOGY OF CARDIAC VALVE DISEASE UNSPECIFIED: ICD-10-CM

## 2024-11-18 DIAGNOSIS — I10 HYPERTENSION, UNSPECIFIED TYPE: ICD-10-CM

## 2024-11-18 DIAGNOSIS — E78.5 DYSLIPIDEMIA: ICD-10-CM

## 2024-11-18 DIAGNOSIS — Z95.0 PACEMAKER: ICD-10-CM

## 2024-11-18 PROCEDURE — 99214 OFFICE O/P EST MOD 30 MIN: CPT | Performed by: NURSE PRACTITIONER

## 2024-11-18 PROCEDURE — 3074F SYST BP LT 130 MM HG: CPT | Performed by: NURSE PRACTITIONER

## 2024-11-18 PROCEDURE — 3078F DIAST BP <80 MM HG: CPT | Performed by: NURSE PRACTITIONER

## 2024-11-18 PROCEDURE — 1111F DSCHRG MED/CURRENT MED MERGE: CPT | Performed by: NURSE PRACTITIONER

## 2024-11-18 PROCEDURE — 93010 ELECTROCARDIOGRAM REPORT: CPT | Performed by: INTERNAL MEDICINE

## 2024-11-18 PROCEDURE — 1036F TOBACCO NON-USER: CPT | Performed by: NURSE PRACTITIONER

## 2024-11-18 PROCEDURE — 1159F MED LIST DOCD IN RCRD: CPT | Performed by: NURSE PRACTITIONER

## 2024-11-18 PROCEDURE — 93005 ELECTROCARDIOGRAM TRACING: CPT | Performed by: NURSE PRACTITIONER

## 2024-11-18 PROCEDURE — 1160F RVW MEDS BY RX/DR IN RCRD: CPT | Performed by: NURSE PRACTITIONER

## 2024-11-18 PROCEDURE — 1157F ADVNC CARE PLAN IN RCRD: CPT | Performed by: NURSE PRACTITIONER

## 2024-11-18 RX ORDER — MULTIVITAMIN/IRON/FOLIC ACID 18MG-0.4MG
1 TABLET ORAL DAILY
COMMUNITY

## 2024-11-18 RX ORDER — KRILL/OM-3/DHA/EPA/PHOSPHO/AST 1000-170MG
CAPSULE ORAL
COMMUNITY

## 2024-11-18 RX ORDER — MULTIVIT-MIN/IRON FUM/FOLIC AC 7.5 MG-4
1 TABLET ORAL DAILY
COMMUNITY

## 2024-11-20 DIAGNOSIS — I25.10 ASHD (ARTERIOSCLEROTIC HEART DISEASE): ICD-10-CM

## 2024-11-20 DIAGNOSIS — Z95.5 S/P CORONARY ARTERY STENT PLACEMENT: Primary | ICD-10-CM

## 2024-11-21 ENCOUNTER — TELEPHONE (OUTPATIENT)
Dept: CARDIOLOGY | Facility: HOSPITAL | Age: 74
End: 2024-11-21
Payer: MEDICARE

## 2024-11-21 NOTE — TELEPHONE ENCOUNTER
Patient left  calling in with a medication problem, however; all medications seem to be filled by Dr. Alanis.

## 2024-11-27 ENCOUNTER — TELEPHONE (OUTPATIENT)
Dept: CARDIOLOGY | Facility: HOSPITAL | Age: 74
End: 2024-11-27
Payer: MEDICARE

## 2024-12-02 ENCOUNTER — TELEPHONE (OUTPATIENT)
Dept: CARDIOLOGY | Facility: HOSPITAL | Age: 74
End: 2024-12-02
Payer: MEDICARE

## 2024-12-02 NOTE — TELEPHONE ENCOUNTER
RN called pt at this time regarding medication problem, pt states he got a cold sore after starting new medications. Pt states this happens all the time with no other symptoms. Pt states he is following up with PCP for his cold sore as well.

## 2024-12-04 ENCOUNTER — CLINICAL SUPPORT (OUTPATIENT)
Dept: CARDIAC REHAB | Facility: HOSPITAL | Age: 74
End: 2024-12-04
Payer: MEDICARE

## 2024-12-04 VITALS
WEIGHT: 265.5 LBS | HEART RATE: 63 BPM | RESPIRATION RATE: 24 BRPM | OXYGEN SATURATION: 94 % | HEIGHT: 70 IN | BODY MASS INDEX: 38.01 KG/M2 | DIASTOLIC BLOOD PRESSURE: 76 MMHG | SYSTOLIC BLOOD PRESSURE: 122 MMHG

## 2024-12-04 DIAGNOSIS — I25.10 ASHD (ARTERIOSCLEROTIC HEART DISEASE): ICD-10-CM

## 2024-12-04 DIAGNOSIS — Z95.5 S/P CORONARY ARTERY STENT PLACEMENT: Primary | ICD-10-CM

## 2024-12-04 RX ORDER — NITROGLYCERIN 0.4 MG/1
0.4 TABLET SUBLINGUAL EVERY 5 MIN PRN
COMMUNITY

## 2024-12-04 ASSESSMENT — PATIENT HEALTH QUESTIONNAIRE - PHQ9
6. FEELING BAD ABOUT YOURSELF - OR THAT YOU ARE A FAILURE OR HAVE LET YOURSELF OR YOUR FAMILY DOWN: NOT AT ALL
1. LITTLE INTEREST OR PLEASURE IN DOING THINGS: NEARLY EVERY DAY
4. FEELING TIRED OR HAVING LITTLE ENERGY: SEVERAL DAYS
SUM OF ALL RESPONSES TO PHQ QUESTIONS 1-9: 4
8. MOVING OR SPEAKING SO SLOWLY THAT OTHER PEOPLE COULD HAVE NOTICED. OR THE OPPOSITE, BEING SO FIGETY OR RESTLESS THAT YOU HAVE BEEN MOVING AROUND A LOT MORE THAN USUAL: NOT AT ALL
7. TROUBLE CONCENTRATING ON THINGS, SUCH AS READING THE NEWSPAPER OR WATCHING TELEVISION: NOT AT ALL
2. FEELING DOWN, DEPRESSED OR HOPELESS: NOT AT ALL
5. POOR APPETITE OR OVEREATING: NOT AT ALL
9. THOUGHTS THAT YOU WOULD BE BETTER OFF DEAD, OR OF HURTING YOURSELF: NOT AT ALL
SUM OF ALL RESPONSES TO PHQ9 QUESTIONS 1 & 2: 3
3. TROUBLE FALLING OR STAYING ASLEEP: NOT AT ALL
10. IF YOU CHECKED OFF ANY PROBLEMS, HOW DIFFICULT HAVE THESE PROBLEMS MADE IT FOR YOU TO DO YOUR WORK, TAKE CARE OF THINGS AT HOME, OR GET ALONG WITH OTHER PEOPLE: SOMEWHAT DIFFICULT

## 2024-12-04 ASSESSMENT — ENCOUNTER SYMPTOMS
OCCASIONAL FEELINGS OF UNSTEADINESS: 1
LOSS OF SENSATION IN FEET: 0

## 2024-12-04 ASSESSMENT — PAIN SCALES - GENERAL: PAINLEVEL_OUTOF10: 4

## 2024-12-04 NOTE — PROGRESS NOTES
Cardiac Rehabilitation Initial Treatment Plan    Name: Garett Stafford  Medical Record Number: [id number]  YOB: 1950  Age: 74 y.o.    Today’s Date: 12/4/2024  Primary Care Physician: Crys Bright DO  Referring Physician: Dami Romero MD  Program Location: [facility]  Primary Diagnosis:   1. S/P coronary artery stent placement  Referral to Cardiac Rehab      2. ASHD (arteriosclerotic heart disease)  Referral to Cardiac Rehab         Onset/Date of Diagnosis: 11/8/2024    Initial Assessment, not yet started program.    AACVPR Risk Stratification:   Moderate    Falls Risk: High  Psychosocial Assessment     Sent PH-Q 9 to MD if score > 20:  PHQ2 score +2. PHQ9  score+4.     Pt reported/currently experiencing stress: No  Patient uses stress management skills: No   History of: no history of anxiety or depression  Currently seeing a mental health provider: No  Social Support: Yes, Whom:Spouse  Quality of Life Survey:   PHQ2 score +2. PHQ9  score+4.   Learning Assessment:  Learning assessment/barriers: None  Preferred learning method: Auditory and Visual  Barriers: None  Comments:    Stages of Change:Contemplation    Psychosocial Plan    Goal Status: Initial Assessment; goals not yet started    Psychosocial Goals: Demonstrating proper techniques for stress management    Psychosocial Interventions/Education: To be done in Cardiac Rehab.  Pt. questioned re: new stress, anxiety, or depressive symptoms.  Pt. Instructed on 3 Minute Breathing Space mindfulness exercise and other stress management strategies.    Nutrition Assessment:    Hyperlipidemia: Yes     Lipids:   Lab Results   Component Value Date    CHOL 246 (H) 11/08/2024    HDL 52.4 11/08/2024    TRIG 121 11/08/2024        LDL calc                                                         169                                    11/08/2024    Current Dietary Guidelines: Low fat, Low sodium  Barriers to dietary change: no    Diet Habit  "Survey:Block Dietary Fat Screener  Pre: Initial survey given. Pending completion and return from patient.  Post: To be done at discharge.    Diabetes Assessment    Lab Results   Component Value Date    HGBA1C 6.0 (H) 11/08/2024       History of Diabetes: No    Weight Management    Height: 177.8 cm (5' 10\")  Weight: 120 kg (265 lb 8 oz)  BMI (Calculated): 38.1  No data recorded    Nutrition Plan    Goal Status: Initial Assessment; goals not yet started    Nutrition Goals: Lipid Goal: HDL>45, LDL <70, Total <180, Trigs <150 and Maintain body weight  Maintain heart healthy low fat, low NA diet.     Nutrition Interventions/Education:   To be done in Cardiac Rehab.  Reviewed recent lipids, HGBA1C, weight, BMI.  Pt. Encouraged to follow heart healthy diet and strategies discussed.  Pt. Encouraged to meet with  dietician.  Booklet given\" Heart Attack, Bouncing Back\"   Block dietary fat screener given to fill out and return.     Exercise Assessment    No  Mode: NA  Frequency: NA  Duration: NA    Exercise Prescription     Exercise Prescription based on: Duke Activity Status Index (DASI)  DASI Score:   13.45  MET Score:  4.4   Frequency:  3 days/week   Mode: Treadmill, Recumbent Cycle, Arm Ergometer, and SciFitStepper   Duration: 30-40 total aerobic minutes   Intensity: RPE 11-13  Target HR:     MET Level: 3.0-6.0  Patient wears supplemental O2: No     Modality Workload METs Duration (minutes)   1 Pre-Exercise   5:00   2 Treadmill 1 MPH   5 :00   3 Recumbent Bike 1   5 :00   4 Arm Ergometer/UEE 0 PULLIAM  5 :00   5 Sci Fit Stepper 1   5 :00   6 Post-Exercise   5:00     Resistance Training: Yes   Home Exercise Prescription given: To be given prior to discharge from program.    Exercise Plan    Goal Status: Initial Assessment; goals not yet started    Exercise Goals: Develop home exercise program 150 total minutes moderate intensity cardiac exercise weekly by end of cardiac rehab program.  30 minutes cardio exercise most " days by end of program.  Cardiac Rehab goal total 40 minutes exercise/session with increased duration by 1-2 minutes per modality for total 40 minutes/session.  Cardiac Rehab goal increased exercise intensity on TM/NS/RB/UEE based on pts. HR/BP/RPE/pain response to exercise.   Amount of time of exercise and resistance /intensity of exercise may be affected by the fact that patient has an active AAA that is being monitored. PCP and cardiologist will be consulted regarding limitations.    Exercise Interventions/Education:   To be done in Cardiac Rehab.  Reviewed cardiac rehab exercise protocol including METS and RPE.  Exercise prescription based on DASI score. Explained to pt. That amount of time of exercise and resistance /intensity of exercise may be affected by the fact that patient has an active AAA that is being monitored. PCP and cardiologist will be consulted regarding limitations.  Encouraged home exercise 30 minutes/day by end of rehab program.       Other Core Components/Risk Factor Assessment:    Medication adherence  Current Medications:   Medication Documentation Review Audit       Reviewed by Ligia Smith RN (Registered Nurse) on 12/04/24 at 1338      Medication Order Taking? Sig Documenting Provider Last Dose Status   acetaminophen (Tylenol) 325 mg tablet [id number] Yes Take 2 tablets (650 mg) by mouth every 4 hours if needed for mild pain (1 - 3), moderate pain (4 - 6), headaches or fever (temp greater than 38.0 C). Jase Alanis MD  Active   aspirin 81 mg chewable tablet [id number] Yes Chew 1 tablet (81 mg) once daily. Jase Alanis MD  Active   B cmplx 4/vit D3/C/folic/zinc (VITAL-D RX ORAL) [id number] Yes Take by mouth. Historical Provider, MD  Active   b complex 0.4 mg tablet [id number] Yes Take 1 tablet by mouth once daily. Historical Provider, MD  Active   carvedilol (Coreg) 6.25 mg tablet [id number] Yes Take 1 tablet (6.25 mg) by mouth 2 times a day. Jase Alanis MD  Active    krill-om-3-dha-epa-phospho-ast (krill oil) 1,884-977-15-80 mg capsule [id number] Yes Take by mouth. Historical Provider, MD  Active   levothyroxine (Synthroid, Levoxyl) 50 mcg tablet [id number] Yes Take 1 tablet (50 mcg) by mouth early in the morning.. Take on an empty stomach at the same time each day, either 30 to 60 minutes prior to breakfast Historical Provider, MD  Active   lisinopriL-hydrochlorothiazide 10-12.5 mg tablet [id number] Yes Take 1 tablet by mouth once daily. Historical Provider, MD  Active   multivitamin with minerals tablet [id number] Yes Take 1 tablet by mouth once daily. Historical Provider, MD  Active   nitroglycerin (Nitrostat) 0.4 mg SL tablet [id number] Yes Place 1 tablet (0.4 mg) under the tongue every 5 minutes if needed for chest pain. Historical Provider, MD  Active   POTASSIUM ORAL [id number] Yes Take 99 mg by mouth 2 times a day.   Patient taking differently: Take 99 mg by mouth once daily.    Historical Provider, MD  Active   ticagrelor (Brilinta) 90 mg tablet [id number] Yes Take 1 tablet (90 mg) by mouth 2 times a day. Jase Alanis MD  Active                                 Medication compliance: Yes   Uses pill box/organizer: Yes    Carries medication list: No     Blood Pressure Management  History of Hypertension: Yes   Medication Changes: No   Resting BP:  Visit Vitals  /76 (BP Location: Left arm, Patient Position: Sitting)   Pulse 63        Heart Failure Management  Hx of Heart Failure: No    Smoking/Tobacco Assessment  Social History     Tobacco Use   Smoking Status Former    Types: Cigarettes   Smokeless Tobacco Never       Other Core Component Plan    Goal Status: Initial Assessment; goals not yet started    Other Core Component Goals: Medication compliance, Verbalize medication usage and drug actions by discharge, and Pt. Carries updated medication list.     Other Core Component Interventions/Education:   Reviewed current medications, indications, dosages.  Encouraged pt.  To carry updated medication list. Medication card given to fill out and carry.   Review effects of exercise on BP.  BP at cardiac rehab pre and post exercise.      Individual Patient Goals:    Improve strength and endurance to return to exercise and do what he wants to do without SOB or fatigue.   Be able to have left knee replacement surgery in near future.     Goal Status: Initial Assessment; goals not yet started    Staff Comments:  Reviewed cardiac rehab exercise protocol including METS and RPE.  Reviewed educational topics to be covered in Cardiac Rehab.   Explained to pt. That amount of time of exercise and resistance /intensity of exercise may be affected by the fact that patient has an active AAA that is being monitored. PCP and cardiologist will be consulted regarding limitations.      Rehab Staff Signature: Ligia Smith RN

## 2024-12-09 ENCOUNTER — APPOINTMENT (OUTPATIENT)
Dept: CARDIAC REHAB | Facility: HOSPITAL | Age: 74
End: 2024-12-09
Payer: MEDICARE

## 2024-12-11 ENCOUNTER — CLINICAL SUPPORT (OUTPATIENT)
Dept: CARDIAC REHAB | Facility: HOSPITAL | Age: 74
End: 2024-12-11
Payer: MEDICARE

## 2024-12-11 DIAGNOSIS — Z95.5 S/P CORONARY ARTERY STENT PLACEMENT: ICD-10-CM

## 2024-12-11 PROCEDURE — 93798 PHYS/QHP OP CAR RHAB W/ECG: CPT | Performed by: INTERNAL MEDICINE

## 2024-12-13 ENCOUNTER — CLINICAL SUPPORT (OUTPATIENT)
Dept: CARDIAC REHAB | Facility: HOSPITAL | Age: 74
End: 2024-12-13
Payer: MEDICARE

## 2024-12-13 DIAGNOSIS — Z95.5 S/P CORONARY ARTERY STENT PLACEMENT: ICD-10-CM

## 2024-12-13 PROCEDURE — 93798 PHYS/QHP OP CAR RHAB W/ECG: CPT | Performed by: INTERNAL MEDICINE

## 2024-12-16 ENCOUNTER — CLINICAL SUPPORT (OUTPATIENT)
Dept: CARDIAC REHAB | Facility: HOSPITAL | Age: 74
End: 2024-12-16
Payer: MEDICARE

## 2024-12-16 DIAGNOSIS — Z95.5 S/P CORONARY ARTERY STENT PLACEMENT: ICD-10-CM

## 2024-12-16 PROCEDURE — 93798 PHYS/QHP OP CAR RHAB W/ECG: CPT | Performed by: INTERNAL MEDICINE

## 2024-12-18 ENCOUNTER — CLINICAL SUPPORT (OUTPATIENT)
Dept: CARDIAC REHAB | Facility: HOSPITAL | Age: 74
End: 2024-12-18
Payer: MEDICARE

## 2024-12-18 DIAGNOSIS — Z95.5 S/P CORONARY ARTERY STENT PLACEMENT: ICD-10-CM

## 2024-12-18 PROCEDURE — 93798 PHYS/QHP OP CAR RHAB W/ECG: CPT | Performed by: INTERNAL MEDICINE

## 2024-12-20 ENCOUNTER — CLINICAL SUPPORT (OUTPATIENT)
Dept: CARDIAC REHAB | Facility: HOSPITAL | Age: 74
End: 2024-12-20
Payer: MEDICARE

## 2024-12-20 DIAGNOSIS — Z95.5 S/P CORONARY ARTERY STENT PLACEMENT: ICD-10-CM

## 2024-12-20 PROCEDURE — 93798 PHYS/QHP OP CAR RHAB W/ECG: CPT | Performed by: INTERNAL MEDICINE

## 2024-12-23 ENCOUNTER — CLINICAL SUPPORT (OUTPATIENT)
Dept: CARDIAC REHAB | Facility: HOSPITAL | Age: 74
End: 2024-12-23
Payer: MEDICARE

## 2024-12-23 DIAGNOSIS — Z95.5 S/P CORONARY ARTERY STENT PLACEMENT: ICD-10-CM

## 2024-12-23 PROCEDURE — 93798 PHYS/QHP OP CAR RHAB W/ECG: CPT | Performed by: INTERNAL MEDICINE

## 2024-12-27 ENCOUNTER — CLINICAL SUPPORT (OUTPATIENT)
Dept: CARDIAC REHAB | Facility: HOSPITAL | Age: 74
End: 2024-12-27
Payer: MEDICARE

## 2024-12-27 DIAGNOSIS — Z95.5 S/P CORONARY ARTERY STENT PLACEMENT: ICD-10-CM

## 2024-12-27 PROCEDURE — 93798 PHYS/QHP OP CAR RHAB W/ECG: CPT | Performed by: INTERNAL MEDICINE

## 2024-12-30 ENCOUNTER — DOCUMENTATION (OUTPATIENT)
Dept: CARDIAC REHAB | Facility: HOSPITAL | Age: 74
End: 2024-12-30

## 2024-12-30 ENCOUNTER — CLINICAL SUPPORT (OUTPATIENT)
Dept: CARDIAC REHAB | Facility: HOSPITAL | Age: 74
End: 2024-12-30
Payer: MEDICARE

## 2024-12-30 VITALS
DIASTOLIC BLOOD PRESSURE: 79 MMHG | HEIGHT: 70 IN | WEIGHT: 266 LBS | BODY MASS INDEX: 38.08 KG/M2 | SYSTOLIC BLOOD PRESSURE: 133 MMHG

## 2024-12-30 DIAGNOSIS — Z95.5 S/P CORONARY ARTERY STENT PLACEMENT: ICD-10-CM

## 2024-12-30 PROCEDURE — 93798 PHYS/QHP OP CAR RHAB W/ECG: CPT | Performed by: INTERNAL MEDICINE

## 2024-12-30 NOTE — PROGRESS NOTES
Cardiac Rehabilitation 30 Day Reassessment    Name: Garett Stafford  Medical Record Number: [id number]  YOB: 1950  Age: 74 y.o.    Today’s Date: 12/30/2024  Primary Care Physician: Crys Bright DO  Referring Physician: Dami Romero MD  Program Location: [facility]  Primary Diagnosis:   S/P coronary artery stent placement  ASHD      Onset/Date of Diagnosis: 11/8/2024    Session # 9    AACVPR Risk Stratification:   Moderate    Falls Risk: High  Psychosocial Assessment     Sent PH-Q 9 to MD if score > 20:  PHQ2 score +2. PHQ9  score+4.     Pt reported/currently experiencing stress: No  Patient uses stress management skills: No   History of: no history of anxiety or depression  Currently seeing a mental health provider: No  Social Support: Yes, Whom:Spouse  Quality of Life Survey:   PHQ2 score +2. PHQ9  score+4.   Learning Assessment:  Learning assessment/barriers: None  Preferred learning method: Auditory and Visual  Barriers: None  Comments:    Stages of Change:Action    Psychosocial Plan    Goal Status: In progress    Psychosocial Goals: Demonstrating proper techniques for stress management    Psychosocial Interventions/Education: To be done in Cardiac Rehab.  Pt. questioned re: new stress, anxiety, or depressive symptoms.  Pt. Instructed on 3 Minute Breathing Space mindfulness exercise and other stress management strategies.  12/30/2024 30 day evaluation- Pt. questioned re: new stress, anxiety, or depressive symptoms. Ponce denies issues with anxiety or depression. Initial PHQ9 indicated minimal depression and not repeated. Ponce reports he was feeling a little down initially after stent but is feeling better and no longer an issue. He denies issues managing stress. He reports that he does not let things get to him. Stress management strategies have been reviewed.     Nutrition Assessment:    Hyperlipidemia: Yes     Lipids:   Lab Results   Component Value Date    CHOL 246 (H)  "11/08/2024    HDL 52.4 11/08/2024    TRIG 121 11/08/2024        LDL calc                                                         169                                    11/08/2024    Current Dietary Guidelines: Low fat, Low sodium  Barriers to dietary change: no    Diet Habit Survey:Block Dietary Fat Screener  Pre:  Initial Block Dietary Fat Screener returned, indicates 30-35% of calories are from fat.  Reviewed with pt. And [facility] H/O \"Facts on Fat\" given.   Post: To be done at discharge.    Diabetes Assessment    Lab Results   Component Value Date    HGBA1C 6.0 (H) 11/08/2024       History of Diabetes: No    Weight Management  12/30/2024 30 day evaluation-  Height: 177.8 cm (5' 10\")  Weight: 121 kg (266 lb)  BMI (Calculated): 38.17      Nutrition Plan    Goal Status: In progress    Nutrition Goals: Lipid Goal: HDL>45, LDL <70, Total <180, Trigs <150 and Maintain body weight  Maintain heart healthy low fat, low NA diet.     Nutrition Interventions/Education:   To be done in Cardiac Rehab.  Reviewed recent lipids, HGBA1C, weight, BMI.  Pt. Encouraged to follow heart healthy diet and strategies discussed.  Pt. Encouraged to meet with  dietician.  Booklet given\" Heart Attack, Bouncing Back\"   Block dietary fat screener given to fill out and return.   12/30/2024 30 day evaluation- No new lipids or HGBA1C.  Weight is maintained. Ponce reports making heart healthy diet choices. Metabolic Syndrome/DM and Healthy Eating education provided through educational videos and H/Os.     Exercise Assessment    No  Mode: NA  Frequency: NA  Duration: NA    Exercise Prescription     Exercise Prescription based on: Duke Activity Status Index (DASI)  DASI Score:   13.45  MET Score:  4.4   Frequency:  3 days/week   Mode: Treadmill, Recumbent Cycle, Arm Ergometer, and SciFitStepper   Duration: 30-40 total aerobic minutes   Intensity: RPE 11-13  Target HR:     MET Level: 3.0-6.0  Patient wears supplemental O2: No     Modality Workload " METs Duration (minutes)   1 Pre-Exercise   5:00   2 Treadmill 1.2 MPH  1.9 10:00   3 Recumbent Bike 2 2.8 10:00   4 Arm Ergometer/UEE 10 bhardwaj  10:00   5 Sci Fit Stepper 1.5 2.5 10:00   6 Post-Exercise   5:00     Resistance Training: Yes   Home Exercise Prescription given: To be given prior to discharge from program.    Exercise Plan    Goal Status: In progress    Exercise Goals: Develop home exercise program 150 total minutes moderate intensity cardiac exercise weekly by end of cardiac rehab program.  30 minutes cardio exercise most days by end of program.  Cardiac Rehab goal total 40 minutes exercise/session with increased duration by 1-2 minutes per modality for total 40 minutes/session.  Cardiac Rehab goal increased exercise intensity on TM/NS/RB/UEE based on pts. HR/BP/RPE/pain response to exercise.   Amount of time of exercise and resistance /intensity of exercise may be affected by the fact that patient has an active AAA that is being monitored. PCP and cardiologist will be consulted regarding limitations.    Exercise Interventions/Education:   To be done in Cardiac Rehab.  Reviewed cardiac rehab exercise protocol including METS and RPE.  Exercise prescription based on DASI score. Explained to pt. That amount of time of exercise and resistance /intensity of exercise may be affected by the fact that patient has an active AAA that is being monitored. PCP and cardiologist will be consulted regarding limitations.  Encouraged home exercise 30 minutes/day by end of rehab program.   12/30/2024 30 day evaluation- Ponce is attending Cardiac Rehab 3x week and is now exercising 40 minutes/session at METs 1.9-2.8. He reports some walking at home but not structured exercise. He is encouraged to walk 10-20 minutes/day on days not at rehab. Plan to slowly increase intensity of exercise at rehab to moderate, R/T issues with AAA that is being monitored by PCP.       Other Core Components/Risk Factor Assessment:    Medication  adherence  Current Medications:   Medication Documentation Review Audit       Reviewed by Ligia Smith RN (Registered Nurse) on 12/04/24 at 1338      Medication Order Taking? Sig Documenting Provider Last Dose Status   acetaminophen (Tylenol) 325 mg tablet [id number] Yes Take 2 tablets (650 mg) by mouth every 4 hours if needed for mild pain (1 - 3), moderate pain (4 - 6), headaches or fever (temp greater than 38.0 C). Jase Alanis MD  Active   aspirin 81 mg chewable tablet [id number] Yes Chew 1 tablet (81 mg) once daily. Jase Alanis MD  Active   B cmplx 4/vit D3/C/folic/zinc (VITAL-D RX ORAL) [id number] Yes Take by mouth. Historical Provider, MD  Active   b complex 0.4 mg tablet [id number] Yes Take 1 tablet by mouth once daily. Historical Provider, MD  Active   carvedilol (Coreg) 6.25 mg tablet [id number] Yes Take 1 tablet (6.25 mg) by mouth 2 times a day. Jase Alanis MD  Active   epycm-gv-3-dha-epa-phospho-ast (krill oil) 1,[id number] mg capsule [id number] Yes Take by mouth. Historical Provider, MD  Active   levothyroxine (Synthroid, Levoxyl) 50 mcg tablet [id number] Yes Take 1 tablet (50 mcg) by mouth early in the morning.. Take on an empty stomach at the same time each day, either 30 to 60 minutes prior to breakfast Historical Provider, MD  Active   lisinopriL-hydrochlorothiazide 10-12.5 mg tablet [id number] Yes Take 1 tablet by mouth once daily. Historical Provider, MD  Active   multivitamin with minerals tablet [id number] Yes Take 1 tablet by mouth once daily. Historical Provider, MD  Active   nitroglycerin (Nitrostat) 0.4 mg SL tablet [id number] Yes Place 1 tablet (0.4 mg) under the tongue every 5 minutes if needed for chest pain. Historical Provider, MD  Active   POTASSIUM ORAL [id number] Yes Take 99 mg by mouth 2 times a day.   Patient taking differently: Take 99 mg by mouth once daily.    Historical Provider, MD  Active   ticagrelor (Brilinta) 90 mg tablet [id number] Yes Take 1 tablet (90 mg) by mouth  2 times a day. Jase Alanis MD  Active                                 Medication compliance: Yes   Uses pill box/organizer: Yes    Carries medication list: Yes     Blood Pressure Management  History of Hypertension: Yes   Medication Changes: No   Resting BP:   12/30/2024 30 day evaluation- Visit Vitals  /79 (Patient Position: Sitting)   Post exercise-112/68    Heart Failure Management  Hx of Heart Failure: No    Smoking/Tobacco Assessment  Social History     Tobacco Use   Smoking Status Former    Types: Cigarettes   Smokeless Tobacco Never       Other Core Component Plan    Goal Status: In progress    Other Core Component Goals: Medication compliance, Verbalize medication usage and drug actions by discharge, and Pt. Carries updated medication list.     Other Core Component Interventions/Education:   Reviewed current medications, indications, dosages.  Encouraged pt. To carry updated medication list. Medication card given to fill out and carry.   Review effects of exercise on BP.  BP at cardiac rehab pre and post exercise.  Resting BP </= 120/70 [facility].   12/30/2024 30 day evaluation-Ponce reports no medication changes since starting in the program. He is compliant with his medication,  knowledgeable on his mediation, and carries an updated medication list. He reports starting to take medications prior to Cardiac Rehab in AM and resting BP results mostly </= 120/70. Will continue to monitor. Heart failure education provided through educational video and H/O.   Returned Cardiac Rehab pre test,  score 13/15. Reviewed with pt.       Individual Patient Goals:    Improve strength and endurance to return to exercise and do what he wants to do without SOB or fatigue.   Be able to have left knee replacement surgery in near future.     Goal Status: In progress    Staff Comments:  Reviewed cardiac rehab exercise protocol including METS and RPE.  Reviewed educational topics to be covered in Cardiac Rehab.    Explained to pt. That amount of time of exercise and resistance /intensity of exercise may be affected by the fact that patient has an active AAA that is being monitored. PCP and cardiologist will be consulted regarding limitations.  12/30/2024 30 day evaluation- Ponce reports improved strength and endurance to exercise at Cardiac rehab and do more at home with less fatigue and minimal SOB. He hopes to have left knee surgery in near future but knows he has to complete Cardiac Rehab and continue to have AAA monitored. He reports to a recent fleeting episode of chest pressure at home that eased with rest. He was encouraged to monitor and report to cardiologist if continues. No ER visits, hospitalizations, or falls since starting in the program.       Rehab Staff Signature: Ligia Smith RN

## 2025-01-03 ENCOUNTER — CLINICAL SUPPORT (OUTPATIENT)
Dept: CARDIAC REHAB | Facility: HOSPITAL | Age: 75
End: 2025-01-03
Payer: MEDICARE

## 2025-01-03 DIAGNOSIS — Z95.5 S/P CORONARY ARTERY STENT PLACEMENT: ICD-10-CM

## 2025-01-03 PROCEDURE — 93798 PHYS/QHP OP CAR RHAB W/ECG: CPT | Performed by: INTERNAL MEDICINE

## 2025-01-06 ENCOUNTER — CLINICAL SUPPORT (OUTPATIENT)
Dept: CARDIAC REHAB | Facility: HOSPITAL | Age: 75
End: 2025-01-06
Payer: MEDICARE

## 2025-01-06 DIAGNOSIS — Z95.5 S/P CORONARY ARTERY STENT PLACEMENT: ICD-10-CM

## 2025-01-06 PROCEDURE — 93798 PHYS/QHP OP CAR RHAB W/ECG: CPT | Performed by: INTERNAL MEDICINE

## 2025-01-08 ENCOUNTER — CLINICAL SUPPORT (OUTPATIENT)
Dept: CARDIAC REHAB | Facility: HOSPITAL | Age: 75
End: 2025-01-08
Payer: MEDICARE

## 2025-01-08 DIAGNOSIS — Z95.5 S/P CORONARY ARTERY STENT PLACEMENT: ICD-10-CM

## 2025-01-08 PROCEDURE — 93798 PHYS/QHP OP CAR RHAB W/ECG: CPT | Performed by: INTERNAL MEDICINE

## 2025-01-10 ENCOUNTER — CLINICAL SUPPORT (OUTPATIENT)
Dept: CARDIAC REHAB | Facility: HOSPITAL | Age: 75
End: 2025-01-10
Payer: MEDICARE

## 2025-01-10 DIAGNOSIS — Z95.5 S/P CORONARY ARTERY STENT PLACEMENT: ICD-10-CM

## 2025-01-10 PROCEDURE — 93798 PHYS/QHP OP CAR RHAB W/ECG: CPT | Performed by: INTERNAL MEDICINE

## 2025-01-13 ENCOUNTER — CLINICAL SUPPORT (OUTPATIENT)
Dept: CARDIAC REHAB | Facility: HOSPITAL | Age: 75
End: 2025-01-13
Payer: MEDICARE

## 2025-01-13 DIAGNOSIS — Z95.5 S/P CORONARY ARTERY STENT PLACEMENT: ICD-10-CM

## 2025-01-13 PROCEDURE — 93798 PHYS/QHP OP CAR RHAB W/ECG: CPT | Performed by: INTERNAL MEDICINE

## 2025-01-15 ENCOUNTER — CLINICAL SUPPORT (OUTPATIENT)
Dept: CARDIAC REHAB | Facility: HOSPITAL | Age: 75
End: 2025-01-15
Payer: MEDICARE

## 2025-01-15 DIAGNOSIS — Z95.5 S/P CORONARY ARTERY STENT PLACEMENT: ICD-10-CM

## 2025-01-15 PROCEDURE — 93798 PHYS/QHP OP CAR RHAB W/ECG: CPT | Performed by: INTERNAL MEDICINE

## 2025-01-17 ENCOUNTER — CLINICAL SUPPORT (OUTPATIENT)
Dept: CARDIAC REHAB | Facility: HOSPITAL | Age: 75
End: 2025-01-17
Payer: MEDICARE

## 2025-01-17 DIAGNOSIS — Z95.5 S/P CORONARY ARTERY STENT PLACEMENT: ICD-10-CM

## 2025-01-17 PROCEDURE — 93798 PHYS/QHP OP CAR RHAB W/ECG: CPT | Performed by: INTERNAL MEDICINE

## 2025-01-20 ENCOUNTER — CLINICAL SUPPORT (OUTPATIENT)
Dept: CARDIAC REHAB | Facility: HOSPITAL | Age: 75
End: 2025-01-20
Payer: MEDICARE

## 2025-01-20 DIAGNOSIS — Z95.5 S/P CORONARY ARTERY STENT PLACEMENT: ICD-10-CM

## 2025-01-20 PROCEDURE — 93798 PHYS/QHP OP CAR RHAB W/ECG: CPT | Performed by: INTERNAL MEDICINE

## 2025-01-22 ENCOUNTER — APPOINTMENT (OUTPATIENT)
Dept: CARDIAC REHAB | Facility: HOSPITAL | Age: 75
End: 2025-01-22
Payer: MEDICARE

## 2025-01-24 ENCOUNTER — CLINICAL SUPPORT (OUTPATIENT)
Dept: CARDIAC REHAB | Facility: HOSPITAL | Age: 75
End: 2025-01-24
Payer: MEDICARE

## 2025-01-24 DIAGNOSIS — Z95.5 S/P CORONARY ARTERY STENT PLACEMENT: ICD-10-CM

## 2025-01-24 PROCEDURE — 93798 PHYS/QHP OP CAR RHAB W/ECG: CPT | Performed by: INTERNAL MEDICINE

## 2025-01-27 ENCOUNTER — DOCUMENTATION (OUTPATIENT)
Dept: CARDIAC REHAB | Facility: HOSPITAL | Age: 75
End: 2025-01-27

## 2025-01-27 ENCOUNTER — CLINICAL SUPPORT (OUTPATIENT)
Dept: CARDIAC REHAB | Facility: HOSPITAL | Age: 75
End: 2025-01-27
Payer: MEDICARE

## 2025-01-27 VITALS
BODY MASS INDEX: 38.17 KG/M2 | DIASTOLIC BLOOD PRESSURE: 68 MMHG | HEIGHT: 70 IN | SYSTOLIC BLOOD PRESSURE: 127 MMHG | WEIGHT: 266.6 LBS

## 2025-01-27 DIAGNOSIS — Z95.5 S/P CORONARY ARTERY STENT PLACEMENT: ICD-10-CM

## 2025-01-27 PROCEDURE — 93798 PHYS/QHP OP CAR RHAB W/ECG: CPT | Performed by: INTERNAL MEDICINE

## 2025-01-27 NOTE — PROGRESS NOTES
Cardiac Rehabilitation 60 Day Reassessment    Name: Garett Stafford  Medical Record Number: [id number]  YOB: 1950  Age: 74 y.o.    Today’s Date: 1/27/2025  Primary Care Physician: Crys Bright DO  Referring Physician: Dami Romero MD  Program Location: [facility]  Primary Diagnosis:   S/P coronary artery stent placement  ASHD      Onset/Date of Diagnosis: 11/8/2024    Session # 19    AACVPR Risk Stratification:   Moderate    Falls Risk: High  Psychosocial Assessment     Sent PH-Q 9 to MD if score > 20:  PHQ2 score +2. PHQ9  score+4.     Pt reported/currently experiencing stress: No  Patient uses stress management skills: No   History of: no history of anxiety or depression  Currently seeing a mental health provider: No  Social Support: Yes, Whom:Spouse  Quality of Life Survey:   PHQ2 score +2. PHQ9  score+4.   Learning Assessment:  Learning assessment/barriers: None  Preferred learning method: Auditory and Visual  Barriers: None  Comments:    Stages of Change:Action    Psychosocial Plan    Goal Status: In progress    Psychosocial Goals: Demonstrating proper techniques for stress management    Psychosocial Interventions/Education: To be done in Cardiac Rehab.  Pt. questioned re: new stress, anxiety, or depressive symptoms.  Pt. Instructed on 3 Minute Breathing Space mindfulness exercise and other stress management strategies.  12/30/2024 30 day evaluation- Pt. questioned re: new stress, anxiety, or depressive symptoms. Ponce denies issues with anxiety or depression. Initial PHQ9 indicated minimal depression and not repeated. Ponce reports he was feeling a little down initially after stent but is feeling better and no longer an issue. He denies issues managing stress. He reports that he does not let things get to him. Stress management strategies have been reviewed.   1/27/2025 60 day evaluation- Pt. questioned re: new stress, anxiety, or depressive symptoms. Ponce denies issues  "managing stress, or issues with anxiety or depression. He reports that he does not let things get to him. Stress management strategies have been reviewed.   Stress, Anxiety, and Depression education provided through educational video and H/O.     Nutrition Assessment:    Hyperlipidemia: Yes     Lipids:   Lab Results   Component Value Date    CHOL 246 (H) 11/08/2024    HDL 52.4 11/08/2024    TRIG 121 11/08/2024        LDL calc                                                         169                                    11/08/2024    Current Dietary Guidelines: Low fat, Low sodium  Barriers to dietary change: no    Diet Habit Survey:Block Dietary Fat Screener  Pre:  Initial Block Dietary Fat Screener returned, indicates 30-35% of calories are from fat.  Reviewed with pt. And [facility] H/O \"Facts on Fat\" given.   Post: To be done at discharge.    Diabetes Assessment    Lab Results   Component Value Date    HGBA1C 6.0 (H) 11/08/2024       History of Diabetes: No    Weight Management  1/27/2025 60 day evaluation-   Height: 177.8 cm (5' 10\")  Weight: 121 kg (266 lb 9.6 oz)  BMI (Calculated): 38.25      Nutrition Plan    Goal Status: In progress    Nutrition Goals: Lipid Goal: HDL>45, LDL <70, Total <180, Trigs <150 and Maintain body weight  Maintain heart healthy low fat, low NA diet.     Nutrition Interventions/Education:   To be done in Cardiac Rehab.  Reviewed recent lipids, HGBA1C, weight, BMI.  Pt. Encouraged to follow heart healthy diet and strategies discussed.  Pt. Encouraged to meet with  dietician.  Booklet given\" Heart Attack, Bouncing Back\"   Block dietary fat screener given to fill out and return.   12/30/2024 30 day evaluation- No new lipids or HGBA1C.  Weight is maintained. Ponce reports making heart healthy diet choices. Metabolic Syndrome/DM and Healthy Eating education provided through educational videos and H/Os.   1/27/2025 60 day evaluation- No new lipids or HGBA1C.  Weight is maintained. Ponce reports " making heart healthy diet choices. Cholesterol education provided through educational video and H/O.     Exercise Assessment    No  Mode: NA  Frequency: NA  Duration: NA    Exercise Prescription     Exercise Prescription based on: Duke Activity Status Index (DASI)  DASI Score:   13.45  MET Score:  4.4   Frequency:  3 days/week   Mode: Treadmill, Recumbent Cycle, Arm Ergometer, and SciFitStepper   Duration: 30-40 total aerobic minutes   Intensity: RPE 11-13  Target HR:     MET Level: 3.0-6.0  Patient wears supplemental O2: No     Modality Workload METs Duration (minutes)   1 Pre-Exercise   5:00   2 Treadmill 1.5 MPH  2.2 10:00   3 Recumbent Bike 2 2.8 10:00   4 Arm Ergometer/UEE UBE 2.5 2.5 10:00   5 Sci Fit Stepper 2.5 2.7 10:00   6 Post-Exercise   5:00     Resistance Training: Yes   Home Exercise Prescription given: To be given prior to discharge from program.    Exercise Plan    Goal Status: In progress    Exercise Goals: Develop home exercise program 150 total minutes moderate intensity cardiac exercise weekly by end of cardiac rehab program.  30 minutes cardio exercise most days by end of program.  Cardiac Rehab goal total 40 minutes exercise/session with increased duration by 1-2 minutes per modality for total 40 minutes/session.  Cardiac Rehab goal increased exercise intensity on TM/NS/RB/UEE based on pts. HR/BP/RPE/pain response to exercise.   Amount of time of exercise and resistance /intensity of exercise may be affected by the fact that patient has an active AAA that is being monitored. PCP and cardiologist will be consulted regarding limitations.    Exercise Interventions/Education:   To be done in Cardiac Rehab.  Reviewed cardiac rehab exercise protocol including METS and RPE.  Exercise prescription based on DASI score. Explained to pt. That amount of time of exercise and resistance /intensity of exercise may be affected by the fact that patient has an active AAA that is being monitored. PCP and  cardiologist will be consulted regarding limitations.  Encouraged home exercise 30 minutes/day by end of rehab program.   12/30/2024 30 day evaluation- Ponce is attending Cardiac Rehab 3x week and is now exercising 40 minutes/session at METs 1.9-2.8. He reports some walking at home but not structured exercise. He is encouraged to walk 10-20 minutes/day on days not at rehab. Plan to slowly increase intensity of exercise at rehab to moderate, R/T issues with AAA that is being monitored by PCP.   1/27/2025 60 day evaluation- Ponce is attending Cardiac Rehab 3x week and is now exercising 40 minutes/session at METs 2.2-2.8. He reports some walking at home but not structured exercise. He is encouraged to walk 10-20 minutes/day on days not at rehab. [facility] and because of some issues with recent back pain episodes at home. His PCP advised him to make no increase in resistance/intensity of exercise at this time. He will be having both a back Xray and Ultrasound to assess his AAA in next weeks so no change in exercise until exams are completed and PCP advises change in exercise routine. Exercise education provided through educational video and H/O.     Other Core Components/Risk Factor Assessment:    Medication adherence  Current Medications:   Medication Documentation Review Audit       Reviewed by Ligia Smith RN (Registered Nurse) on 12/04/24 at 1338      Medication Order Taking? Sig Documenting Provider Last Dose Status   acetaminophen (Tylenol) 325 mg tablet [id number] Yes Take 2 tablets (650 mg) by mouth every 4 hours if needed for mild pain (1 - 3), moderate pain (4 - 6), headaches or fever (temp greater than 38.0 C). Jase Alanis MD  Active   aspirin 81 mg chewable tablet [id number] Yes Chew 1 tablet (81 mg) once daily. Jase Alanis MD  Active   B cmplx 4/vit D3/C/folic/zinc (VITAL-D RX ORAL) [id number] Yes Take by mouth. Historical Provider, MD  Active   b complex 0.4 mg  tablet [id number] Yes Take 1 tablet by mouth once daily. Historical Provider, MD  Active   carvedilol (Coreg) 6.25 mg tablet [id number] Yes Take 1 tablet (6.25 mg) by mouth 2 times a day. Jase Alanis MD  Active   qxfrt-ba-8-dha-epa-phospho-ast (krill oil) 1,202-630-16-80 mg capsule [id number] Yes Take by mouth. Pedro Curiel MD  Active   levothyroxine (Synthroid, Levoxyl) 50 mcg tablet [id number] Yes Take 1 tablet (50 mcg) by mouth early in the morning.. Take on an empty stomach at the same time each day, either 30 to 60 minutes prior to breakfast Historical Provider, MD  Active   lisinopriL-hydrochlorothiazide 10-12.5 mg tablet [id number] Yes Take 1 tablet by mouth once daily. Historical Provider, MD  Active   multivitamin with minerals tablet [id number] Yes Take 1 tablet by mouth once daily. Historical Provider, MD  Active   nitroglycerin (Nitrostat) 0.4 mg SL tablet [id number] Yes Place 1 tablet (0.4 mg) under the tongue every 5 minutes if needed for chest pain. Historical Provider, MD  Active   POTASSIUM ORAL [id number] Yes Take 99 mg by mouth 2 times a day.   Patient taking differently: Take 99 mg by mouth once daily.    Historical Provider, MD  Active   ticagrelor (Brilinta) 90 mg tablet [id number] Yes Take 1 tablet (90 mg) by mouth 2 times a day. Jase Alanis MD  Active                                 Medication compliance: Yes   Uses pill box/organizer: Yes    Carries medication list: Yes     Blood Pressure Management  History of Hypertension: Yes   Medication Changes: No   Resting BP:   1/27/2025 60 day evaluation-   Visit Vitals  /68 (Patient Position: Sitting)       Heart Failure Management  Hx of Heart Failure: No    Smoking/Tobacco Assessment  Social History     Tobacco Use   Smoking Status Former    Types: Cigarettes   Smokeless Tobacco Never       Other Core Component Plan    Goal Status: In progress    Other Core Component Goals: Medication compliance, Verbalize medication usage and drug  actions by discharge, and Pt. Carries updated medication list.     Other Core Component Interventions/Education:   Reviewed current medications, indications, dosages.  Encouraged pt. To carry updated medication list. Medication card given to fill out and carry.   Review effects of exercise on BP.  BP at cardiac rehab pre and post exercise.  Resting BP </= 120/70 per [facility].   12/30/2024 30 day evaluation-Ponce reports no medication changes since starting in the program. He is compliant with his medication,  knowledgeable on his mediation, and carries an updated medication list. He reports starting to take medications prior to Cardiac Rehab in AM and resting BP results mostly </= 120/70. Will continue to monitor. Heart failure education provided through educational video and H/O.   Returned Cardiac Rehab pre test,  score 13/15. Reviewed with pt.   1/27/2025 60 day evaluation- Ponce reports no medication changes since starting in the program. He is compliant with his medication,  knowledgeable on his mediation, and carries an updated medication list. Resting BP results mostly </= 120/70 at Cardiac Rehab. Bps have been reported to [facility]. Will continue to monitor resting BP before and after exercise. Blood Pressure education provided through educational video and H/O.       Individual Patient Goals:    Improve strength and endurance to return to exercise and do what he wants to do without SOB or fatigue.   Be able to have left knee replacement surgery in near future.     Goal Status: In progress    Staff Comments:  Reviewed cardiac rehab exercise protocol including METS and RPE.  Reviewed educational topics to be covered in Cardiac Rehab.   Explained to pt. That amount of time of exercise and resistance /intensity of exercise may be affected by the fact that patient has an active AAA that is being monitored. PCP and cardiologist will be consulted regarding limitations.  12/30/2024 30 day evaluation- Ponce reports improved  strength and endurance to exercise at Cardiac rehab and do more at home with less fatigue and minimal SOB. He hopes to have left knee surgery in near future but knows he has to complete Cardiac Rehab and continue to have AAA monitored. He reports to a recent fleeting episode of chest pressure at home that eased with rest. He was encouraged to monitor and report to cardiologist if continues. No ER visits, hospitalizations, or falls since starting in the program.   1/27/2025 60 day evaluation- Ponce reports improved strength and endurance to exercise at Cardiac rehab and do more at home with less fatigue and minimal SOB. He hopes to have left knee surgery in near future but knows he has to complete Cardiac Rehab and continue to have AAA monitored. No further chest pressure reported but he did report some back pain issues at home that is being assessed [facility]. No ER visits, hospitalizations, or falls since starting in the program.     Rehab Staff Signature: Ligia Smith RN                        Other Core Components/Risk Factor Assessment:    Medication adherence  Current Medications:   Medication Documentation Review Audit       Reviewed by Ligia Smith RN (Registered Nurse) on 12/04/24 at 1338      Medication Order Taking? Sig Documenting Provider Last Dose Status   acetaminophen (Tylenol) 325 mg tablet [id number] Yes Take 2 tablets (650 mg) by mouth every 4 hours if needed for mild pain (1 - 3), moderate pain (4 - 6), headaches or fever (temp greater than 38.0 C). Jase Alanis MD  Active   aspirin 81 mg chewable tablet [id number] Yes Chew 1 tablet (81 mg) once daily. Jase Alanis MD  Active   B cmplx 4/vit D3/C/folic/zinc (VITAL-D RX ORAL) [id number] Yes Take by mouth. Historical Provider, MD  Active   b complex 0.4 mg tablet [id number] Yes Take 1 tablet by mouth once daily. Historical Provider, MD  Active   carvedilol (Coreg) 6.25 mg tablet [id number] Yes Take 1 tablet (6.25 mg) by mouth 2 times a day.  Jase Alanis MD  Active   hshxq-ds-1-dha-epa-phospho-ast (krill oil) 1,205-227-85-80 mg capsule [id number] Yes Take by mouth. Historical Provider, MD  Active   levothyroxine (Synthroid, Levoxyl) 50 mcg tablet [id number] Yes Take 1 tablet (50 mcg) by mouth early in the morning.. Take on an empty stomach at the same time each day, either 30 to 60 minutes prior to breakfast Historical Provider, MD  Active   lisinopriL-hydrochlorothiazide 10-12.5 mg tablet [id number] Yes Take 1 tablet by mouth once daily. Historical Provider, MD  Active   multivitamin with minerals tablet [id number] Yes Take 1 tablet by mouth once daily. Historical Provider, MD  Active   nitroglycerin (Nitrostat) 0.4 mg SL tablet [id number] Yes Place 1 tablet (0.4 mg) under the tongue every 5 minutes if needed for chest pain. Historical Provider, MD  Active   POTASSIUM ORAL [id number] Yes Take 99 mg by mouth 2 times a day.   Patient taking differently: Take 99 mg by mouth once daily.    Historical Provider, MD  Active   ticagrelor (Brilinta) 90 mg tablet [id number] Yes Take 1 tablet (90 mg) by mouth 2 times a day. Jase Alanis MD  Active

## 2025-01-29 ENCOUNTER — CLINICAL SUPPORT (OUTPATIENT)
Dept: CARDIAC REHAB | Facility: HOSPITAL | Age: 75
End: 2025-01-29
Payer: MEDICARE

## 2025-01-29 DIAGNOSIS — Z95.5 S/P CORONARY ARTERY STENT PLACEMENT: ICD-10-CM

## 2025-01-29 PROCEDURE — 93798 PHYS/QHP OP CAR RHAB W/ECG: CPT | Performed by: INTERNAL MEDICINE

## 2025-01-31 ENCOUNTER — CLINICAL SUPPORT (OUTPATIENT)
Dept: CARDIAC REHAB | Facility: HOSPITAL | Age: 75
End: 2025-01-31
Payer: MEDICARE

## 2025-01-31 DIAGNOSIS — Z95.5 S/P CORONARY ARTERY STENT PLACEMENT: ICD-10-CM

## 2025-01-31 PROCEDURE — 93798 PHYS/QHP OP CAR RHAB W/ECG: CPT | Performed by: INTERNAL MEDICINE

## 2025-02-03 ENCOUNTER — CLINICAL SUPPORT (OUTPATIENT)
Dept: CARDIAC REHAB | Facility: HOSPITAL | Age: 75
End: 2025-02-03
Payer: MEDICARE

## 2025-02-03 DIAGNOSIS — Z95.5 S/P CORONARY ARTERY STENT PLACEMENT: ICD-10-CM

## 2025-02-03 PROCEDURE — 93798 PHYS/QHP OP CAR RHAB W/ECG: CPT | Performed by: INTERNAL MEDICINE

## 2025-02-05 ENCOUNTER — CLINICAL SUPPORT (OUTPATIENT)
Dept: CARDIAC REHAB | Facility: HOSPITAL | Age: 75
End: 2025-02-05
Payer: MEDICARE

## 2025-02-05 DIAGNOSIS — Z95.5 S/P CORONARY ARTERY STENT PLACEMENT: ICD-10-CM

## 2025-02-05 PROCEDURE — 93798 PHYS/QHP OP CAR RHAB W/ECG: CPT | Performed by: INTERNAL MEDICINE

## 2025-02-07 ENCOUNTER — CLINICAL SUPPORT (OUTPATIENT)
Dept: CARDIAC REHAB | Facility: HOSPITAL | Age: 75
End: 2025-02-07
Payer: MEDICARE

## 2025-02-07 DIAGNOSIS — Z95.5 S/P CORONARY ARTERY STENT PLACEMENT: ICD-10-CM

## 2025-02-07 PROCEDURE — 93798 PHYS/QHP OP CAR RHAB W/ECG: CPT | Performed by: INTERNAL MEDICINE

## 2025-02-10 ENCOUNTER — CLINICAL SUPPORT (OUTPATIENT)
Dept: CARDIAC REHAB | Facility: HOSPITAL | Age: 75
End: 2025-02-10
Payer: MEDICARE

## 2025-02-10 DIAGNOSIS — Z95.5 S/P CORONARY ARTERY STENT PLACEMENT: ICD-10-CM

## 2025-02-10 PROCEDURE — 93798 PHYS/QHP OP CAR RHAB W/ECG: CPT | Performed by: INTERNAL MEDICINE

## 2025-02-12 ENCOUNTER — CLINICAL SUPPORT (OUTPATIENT)
Dept: CARDIAC REHAB | Facility: HOSPITAL | Age: 75
End: 2025-02-12
Payer: MEDICARE

## 2025-02-12 DIAGNOSIS — Z95.5 S/P CORONARY ARTERY STENT PLACEMENT: ICD-10-CM

## 2025-02-12 PROCEDURE — 93798 PHYS/QHP OP CAR RHAB W/ECG: CPT | Performed by: INTERNAL MEDICINE

## 2025-02-14 ENCOUNTER — CLINICAL SUPPORT (OUTPATIENT)
Dept: CARDIAC REHAB | Facility: HOSPITAL | Age: 75
End: 2025-02-14
Payer: MEDICARE

## 2025-02-14 DIAGNOSIS — Z95.5 S/P CORONARY ARTERY STENT PLACEMENT: ICD-10-CM

## 2025-02-14 PROCEDURE — 93798 PHYS/QHP OP CAR RHAB W/ECG: CPT | Performed by: INTERNAL MEDICINE

## 2025-02-16 NOTE — PROGRESS NOTES
"Counseling:  The patient was counseled regarding diagnostic results, instructions for management, risk factor reductions, prognosis, patient and family education, impressions, risks and benefits of treatment options and importance of compliance with treatment.      Chief Complaint:   The patient presents today for 3-month followup of CAD, CHB and HTN.      History Of Present Illness:    Garett Stafford is a 74 y.o. male who presents today for 3-month followup of CAD, CHB and HTN. His PMH is significant for CAD s/p multiple prior PCI with h/o NSTEMI s/p PCI of LAD 11/08/2024, CHB s/p PPM 07/2021, AAA, dyslipidemia, HTN, and aortic regurgitation. Over the past 3 months, the patient states that he has done well from a cardiac standpoint. He reports that prior to starting therapy he was having episodes of angina requiring nitroglycerine; however, since starting therapy, these symptoms have resolved. He reports left arm numbness if he keeps his arm in the same position for too long. BP has been stable. The patient is compliant with his prescribed medications.        Last Recorded Vitals:  Vitals:    02/17/25 0951   BP: 100/60   BP Location: Left arm   Patient Position: Sitting   BP Cuff Size: Large adult   Pulse: 76   SpO2: 97%   Weight: 122 kg (268 lb 6.4 oz)   Height: 1.753 m (5' 9\")       Past Surgical History:  He has a past surgical history that includes Cardiac surgery; Appendectomy; Tonsillectomy; Joint replacement; Cardiac catheterization (N/A, 11/08/2024); Cardiac catheterization (N/A, 11/08/2024); Cardiac catheterization (N/A, 11/08/2024); Cardiac catheterization (N/A, 11/08/2024); and Coronary stent placement.      Social History:  He reports that he has quit smoking. His smoking use included cigarettes. He has never been exposed to tobacco smoke. He has never used smokeless tobacco. He reports current alcohol use. He reports that he does not use drugs.    Family History:  Family History   Problem Relation Name " Age of Onset    Diabetes Mother          Allergies:  Adhesive tape-silicones, Colestipol, Demerol [meperidine], Lipitor [atorvastatin], Simvastatin, Titanium, Augmentin [amoxicillin-pot clavulanate], Aspirin, Fenofibrate, Magnesium, Niacin, Adhes. band-tape-benzalkonium, and Hydrocodone-acetaminophen    Outpatient Medications:  Current Outpatient Medications   Medication Instructions    acetaminophen (TYLENOL) 650 mg, oral, Every 4 hours PRN    aspirin 81 mg, oral, Daily    B cmplx 4/vit D3/C/folic/zinc (VITAL-D RX ORAL) Take by mouth.    b complex 0.4 mg tablet 1 tablet, Daily    Brilinta 90 mg, oral, 2 times daily    carvedilol (Coreg) 6.25 mg tablet Take 1 tablet (6.25 mg) by mouth 2 times a day.    krill-om-3-dha-epa-phospho-ast (krill oil) [id number] mg capsule Take by mouth.    levothyroxine (SYNTHROID, LEVOXYL) 50 mcg, Daily    lisinopriL-hydrochlorothiazide 10-12.5 mg tablet 1 tablet, Daily    multivitamin with minerals tablet 1 tablet, Daily    nitroglycerin (NITROSTAT) 0.4 mg, Every 5 min PRN    POTASSIUM ORAL 99 mg, 2 times daily (0900,1400)     Review of Systems   Neurological:  Positive for paresthesias (left arm).   All other systems reviewed and are negative.     Physical Exam:  Constitutional:       Appearance: Healthy appearance. Not in distress.   Neck:      Vascular: No JVR. JVD normal.   Pulmonary:      Effort: Pulmonary effort is normal.      Breath sounds: Normal breath sounds. No wheezing. No rhonchi. No rales.   Chest:      Chest wall: Not tender to palpatation.   Cardiovascular:      PMI at left midclavicular line. Normal rate. Regular rhythm. Normal S1. Normal S2.       Murmurs: There is no murmur.      No gallop.  No click. No rub.   Pulses:     Intact distal pulses.   Edema:     Peripheral edema absent.   Abdominal:      General: Bowel sounds are normal.      Palpations: Abdomen is soft.      Tenderness: There is no abdominal tenderness.   Musculoskeletal: Normal range of motion.          General: No tenderness. Skin:     General: Skin is warm and dry.   Neurological:      General: No focal deficit present.      Mental Status: Alert and oriented to person, place and time.          Last Labs:  CBC -  Lab Results   Component Value Date    WBC 8.3 11/09/2024    HGB 14.1 11/09/2024    HCT 42.1 11/09/2024    MCV 94 11/09/2024     11/09/2024       CMP -  Lab Results   Component Value Date    CALCIUM 9.0 11/09/2024    PHOS 2.4 (L) 11/09/2024    PROT 6.4 11/09/2024    ALBUMIN 3.9 11/09/2024    AST 19 11/09/2024    ALT 12 11/09/2024    ALKPHOS 68 11/09/2024    BILITOT 0.6 11/09/2024       LIPID PANEL -   Lab Results   Component Value Date    CHOL 246 (H) 11/08/2024    TRIG 121 11/08/2024    HDL 52.4 11/08/2024    CHHDL 4.7 11/08/2024    VLDL 24 11/08/2024    NHDL 194 (H) 11/08/2024       RENAL FUNCTION PANEL -   Lab Results   Component Value Date    GLUCOSE 134 (H) 11/09/2024     11/09/2024    K 3.8 11/09/2024     11/09/2024    CO2 26 11/09/2024    ANIONGAP 11 11/09/2024    BUN 13 11/09/2024    CREATININE 1.10 11/09/2024    CALCIUM 9.0 11/09/2024    PHOS 2.4 (L) 11/09/2024    ALBUMIN 3.9 11/09/2024        Lab Results   Component Value Date    BNP 29 11/08/2024    HGBA1C 6.0 (H) 11/08/2024       Last Cardiology Tests:  11/08/2024 - Cardiac Catheterization (LH)  1. Double vessel disease.  2. Severe proximal LAD stenosis - successfully treated with OCT guided PCI of the LAD.  3.  of the RCA with mature collaterals from the left.  4. High dose statin therapy.  5. DAPT for 12 months.  6. To consider PCI of the  if symptoms persist.    11/08/2024 - TTE  1. Poorly visualized anatomical structures due to suboptimal image quality.  2. The left ventricular systolic function is low normal, with a visually estimated ejection fraction of 50-55%.  3. Entire anterior septum, apical lateral segment, and apex are abnormal.  4. Left ventricular diastolic filling was indeterminate.  5. Left  ventricular cavity size is mildly dilated.  6. There is normal right ventricular global systolic function.  7. Moderately elevated right ventricular systolic pressure.  8. Aortic valve stenosis is not present.  9. Mild aortic valve regurgitation.  10. There is moderately increased septal thickness.    10/30/2024 - CTA Chest/Abdomen/Pelvis  CHEST:  1.  No dissection or aneurysm of the thoracic aorta. No pulmonary emboli.  2. There is a small area of scarring or fibrosis at the base of the left lower lobe with stable 4 mm pulmonary nodule in right middle lobe.  ABDOMEN-PELVIS:  1.  There is fusiform aneurysm of the mid and distal abdominal aorta with distal abdominal aortic aneurysm measuring 4.6 cm in transverse dimension and 5.0 cm in AP dimension. Previously, the aneurysm measured 4.2 x 4.8 cm in size.  2. Mild prostatic enlargement.  3. Atrophic pancreas.    07/25/2021 - Electrophysiology Procedure  Successful fluoroscopy guidance for confirmation of Temporary Pacemaker.     07/22/2021 - TTE  1. The left ventricular systolic function is mildly decreased with a 45-50% estimated ejection fraction.  2. Moderately enlarged right ventricle.  3. There is moderately reduced right ventricular systolic function.  4. The left atrium is moderately dilated.  5. There is global hypokinesis of the left ventricle with minor regional variations.     Lab review: I have personally reviewed the laboratory result(s).    Assessment/Plan   1) CAD s/p Multiple Prior PCI - NSTEMI s/p PCI of LAD 11/2024   On DAPT - ASA 81 mg daily, Brilinta 90 mg BID  On carvedilol 6.25 mg BID, lisinopril-HCTZ 10-12.5 mg daily  Allergy listed to atorvastatin and simvastatin  Hospital admission 11/08/2024 to 11/09/2024 with NSTEMI  TTE with LVEF 50-55%, normal RV systolic function, moderately elevated RVSP  C with double vessel disease, severe proximal LAD stenosis,  of RCA with mature collaterals from the left s/p PCI of LAD  To consider PCI of   "if symptoms persist  Reports episodes of angina requiring nitroglycerine prior to starting therapy   Symptoms have resolved since starting therapy   Reports numbness to left arm with sustained positioning - likely s/t neuropathy  BP stable  Continue current medical Rx   Followup with Zuly Eid NP, in 3 months        2) Dyslipidemia  Goal LDL <70  Allergy listed to atorvastatin and simvastatin - \"unable to use left arm\"  Lipid panel 11/08/2024 with LDL of 169  Discussed PCSK9 inhibitors   Patient expressed concern of side effects - patient reassured that PCSK9 inhibitors do not come with the same side effect profile of statins.   Patient willing to trial a PCSK9 inhibitor  Start Repatha - printed Rx [facility]  Followup with Zuly Eid NP, in 3 months       3) CHB s/p [employer] PPM July 2021   Device is plastic coated d/t metal allergies, including titanium   [facility]  Stable   Followup with Zuly Eid NP, in 3 months       4) HTN   Stable  On carvedilol 6.25 mg BID, lisinopril-HCTZ 10-12.5 mg daily   Continue current medical Rx   Followup with Zuly Eid NP, in 3 months       5) Aortic Regurgitation  TTE 11/08/2024 with mild aortic regurgitation   BP stable      6) Abdominal Aortic Aneurysm  [facility]  CTA chest/abdomen/pelvis 10/30/2024 with fusiform aneurysm of mid and distal abdominal aorta with distal abdominal aortic aneurysm measuring 4.6 cm in transverse dimension and 5.0 cm in AP dimension; previously,  aneurysm measured 4.2 x 4.8 cm in size.        Scribe Attestation  By signing my name below, I, Jeffry Maldonado   attest that this documentation has been prepared under the direction and in the presence of Dami Romero MD.   "

## 2025-02-17 ENCOUNTER — OFFICE VISIT (OUTPATIENT)
Dept: CARDIOLOGY | Facility: HOSPITAL | Age: 75
End: 2025-02-17
Payer: OTHER GOVERNMENT

## 2025-02-17 ENCOUNTER — DOCUMENTATION (OUTPATIENT)
Dept: CARDIAC REHAB | Facility: HOSPITAL | Age: 75
End: 2025-02-17

## 2025-02-17 ENCOUNTER — CLINICAL SUPPORT (OUTPATIENT)
Dept: CARDIAC REHAB | Facility: HOSPITAL | Age: 75
End: 2025-02-17
Payer: MEDICARE

## 2025-02-17 VITALS
BODY MASS INDEX: 39.75 KG/M2 | OXYGEN SATURATION: 97 % | HEART RATE: 76 BPM | DIASTOLIC BLOOD PRESSURE: 60 MMHG | SYSTOLIC BLOOD PRESSURE: 100 MMHG | HEIGHT: 69 IN | WEIGHT: 268.4 LBS

## 2025-02-17 VITALS
DIASTOLIC BLOOD PRESSURE: 68 MMHG | WEIGHT: 261.8 LBS | SYSTOLIC BLOOD PRESSURE: 114 MMHG | BODY MASS INDEX: 38.78 KG/M2 | HEIGHT: 69 IN

## 2025-02-17 DIAGNOSIS — I25.10 CAD IN NATIVE ARTERY: ICD-10-CM

## 2025-02-17 DIAGNOSIS — Z95.0 PACEMAKER: ICD-10-CM

## 2025-02-17 DIAGNOSIS — I35.1 AORTIC VALVE INSUFFICIENCY, ETIOLOGY OF CARDIAC VALVE DISEASE UNSPECIFIED: ICD-10-CM

## 2025-02-17 DIAGNOSIS — Z95.5 S/P CORONARY ARTERY STENT PLACEMENT: ICD-10-CM

## 2025-02-17 DIAGNOSIS — E78.5 DYSLIPIDEMIA: ICD-10-CM

## 2025-02-17 DIAGNOSIS — I10 HYPERTENSION, UNSPECIFIED TYPE: ICD-10-CM

## 2025-02-17 PROCEDURE — 93798 PHYS/QHP OP CAR RHAB W/ECG: CPT | Performed by: INTERNAL MEDICINE

## 2025-02-17 PROCEDURE — 99213 OFFICE O/P EST LOW 20 MIN: CPT | Performed by: INTERNAL MEDICINE

## 2025-02-17 RX ORDER — EVOLOCUMAB 140 MG/ML
140 INJECTION, SOLUTION SUBCUTANEOUS
Qty: 6 ML | Refills: 3 | Status: SHIPPED | OUTPATIENT
Start: 2025-02-17 | End: 2026-02-17

## 2025-02-17 ASSESSMENT — ENCOUNTER SYMPTOMS
LOSS OF SENSATION IN FEET: 0
PARESTHESIAS: 1
OCCASIONAL FEELINGS OF UNSTEADINESS: 0
DEPRESSION: 0

## 2025-02-17 NOTE — PATIENT INSTRUCTIONS
For your cholesterol, Dr. Romero has prescribed Repatha, which is an every 2 week injection. A printed prescription has been provided to you today [facility].  Continue all other medications as prescribed.  Followup with Zuly Eid NP, in 3 months.    If you have any questions or cardiac concerns, please call our office at [phone].

## 2025-02-17 NOTE — PROGRESS NOTES
Cardiac Rehabilitation 90 Day Reassessment    Name: Garett Stafford  Medical Record Number: [id number]  YOB: 1950  Age: 74 y.o.    Today’s Date: 2/17/2025  Primary Care Physician: Crys Bright DO  Referring Physician: Dami Romero MD  Program Location: [facility]  Primary Diagnosis:   S/P coronary artery stent placement  ASHD      Onset/Date of Diagnosis: 11/8/2024    Session # 28    AACVPR Risk Stratification:   Moderate    Falls Risk: High  Psychosocial Assessment     Sent PH-Q 9 to MD if score > 20:  PHQ2 score +2. PHQ9  score+4.     Pt reported/currently experiencing stress: No  Patient uses stress management skills: No   History of: no history of anxiety or depression  Currently seeing a mental health provider: No  Social Support: Yes, Whom:Spouse  Quality of Life Survey:   PHQ2 score +2. PHQ9  score+4.   Learning Assessment:  Learning assessment/barriers: None  Preferred learning method: Auditory and Visual  Barriers: None  Comments:    Stages of Change:Action    Psychosocial Plan    Goal Status: In progress    Psychosocial Goals: Demonstrating proper techniques for stress management    Psychosocial Interventions/Education: To be done in Cardiac Rehab.  Pt. questioned re: new stress, anxiety, or depressive symptoms.  Pt. Instructed on 3 Minute Breathing Space mindfulness exercise and other stress management strategies.  12/30/2024 30 day evaluation- Pt. questioned re: new stress, anxiety, or depressive symptoms. Ponce denies issues with anxiety or depression. Initial PHQ9 indicated minimal depression and not repeated. Ponce reports he was feeling a little down initially after stent but is feeling better and no longer an issue. He denies issues managing stress. He reports that he does not let things get to him. Stress management strategies have been reviewed.   1/27/2025 60 day evaluation- Pt. questioned re: new stress, anxiety, or depressive symptoms. Ponce denies issues  "managing stress, or issues with anxiety or depression. He reports that he does not let things get to him. Stress management strategies have been reviewed.   Stress, Anxiety, and Depression education provided through educational video and H/O.   2/17/2025 90 day evaluation- Done early as  will be on vacation. Pt. questioned re: new stress, anxiety, or depressive symptoms. Ponce denies issues managing stress, or issues with anxiety or depression. He reports that he does not let things get to him. Stress management strategies have been reviewed.     Nutrition Assessment:    Hyperlipidemia: Yes     Lipids:   Lab Results   Component Value Date    CHOL 246 (H) 11/08/2024    HDL 52.4 11/08/2024    TRIG 121 11/08/2024        LDL calc                                                         169                                    11/08/2024    Current Dietary Guidelines: Low fat, Low sodium  Barriers to dietary change: no    Diet Habit Survey:Block Dietary Fat Screener  Pre:  Initial Block Dietary Fat Screener returned, indicates 30-35% of calories are from fat.  Reviewed with pt. And [facility] H/O \"Facts on Fat\" given.   Post: To be done at discharge.    Diabetes Assessment    Lab Results   Component Value Date    HGBA1C 6.0 (H) 11/08/2024       History of Diabetes: No    Weight Management  2/17/2025 90 day evaluation-   Height: 175.3 cm (5' 9.02\")  Weight: 119 kg (261 lb 12.8 oz)  BMI (Calculated): 38.64      Nutrition Plan    Goal Status: In progress    Nutrition Goals: Lipid Goal: HDL>45, LDL <70, Total <180, Trigs <150 and Maintain body weight  Maintain heart healthy low fat, low NA diet.     Nutrition Interventions/Education:   To be done in Cardiac Rehab.  Reviewed recent lipids, HGBA1C, weight, BMI.  Pt. Encouraged to follow heart healthy diet and strategies discussed.  Pt. Encouraged to meet with  dietician.  Booklet given\" Heart Attack, Bouncing Back\"   Block dietary fat screener given to fill out and return. "   12/30/2024 30 day evaluation- No new lipids or HGBA1C.  Weight is maintained. Ponce reports making heart healthy diet choices. Metabolic Syndrome/DM and Healthy Eating education provided through educational videos and H/Os.   1/27/2025 60 day evaluation- No new lipids or HGBA1C.  Weight is maintained. Ponce reports making heart healthy diet choices. Cholesterol education provided through educational video and H/O.   2/17/2025 90 day evaluation- Done early as  will be on vacation. No new lipids or HGBA1C.  Weight is maintained. Ponce reports making heart healthy diet choices.     Exercise Assessment    No  Mode: NA  Frequency: NA  Duration: NA    Exercise Prescription     Exercise Prescription based on: Duke Activity Status Index (DASI)  DASI Score:   13.45  MET Score:  4.4   Frequency:  3 days/week   Mode: Treadmill, Recumbent Cycle, Arm Ergometer, and SciFitStepper   Duration: 30-40 total aerobic minutes   Intensity: RPE 11-13  Target HR:     MET Level: 3.0-6.0  Patient wears supplemental O2: No     Modality Workload METs Duration (minutes)   1 Pre-Exercise   5:00   2 Treadmill 1.6 MPH  2.2 10:00   3 Recumbent Bike 3 2.8 10:00   4 Arm Ergometer/UEE UBE 2.6 2.6 10:00   5 Sci Fit Stepper 2.8 2.9 10:00   6 Post-Exercise   5:00     Resistance Training: Yes   Home Exercise Prescription given: To be given prior to discharge from program.    Exercise Plan    Goal Status: In progress    Exercise Goals: Develop home exercise program 150 total minutes moderate intensity cardiac exercise weekly by end of cardiac rehab program.  30 minutes cardio exercise most days by end of program.  Cardiac Rehab goal total 40 minutes exercise/session with increased duration by 1-2 minutes per modality for total 40 minutes/session.  Cardiac Rehab goal increased exercise intensity on TM/NS/RB/UEE based on pts. HR/BP/RPE/pain response to exercise.   Amount of time of exercise and resistance /intensity of exercise may be  affected by the fact that patient has an active AAA that is being monitored. PCP and cardiologist will be consulted regarding limitations.    Exercise Interventions/Education:   To be done in Cardiac Rehab.  Reviewed cardiac rehab exercise protocol including METS and RPE.  Exercise prescription based on DASI score. Explained to pt. That amount of time of exercise and resistance /intensity of exercise may be affected by the fact that patient has an active AAA that is being monitored. PCP and cardiologist will be consulted regarding limitations.  Encouraged home exercise 30 minutes/day by end of rehab program.   12/30/2024 30 day evaluation- Ponce is attending Cardiac Rehab 3x week and is now exercising 40 minutes/session at METs 1.9-2.8. He reports some walking at home but not structured exercise. He is encouraged to walk 10-20 minutes/day on days not at rehab. Plan to slowly increase intensity of exercise at rehab to moderate, R/T issues with AAA that is being monitored by PCP.   1/27/2025 60 day evaluation- Ponce is attending Cardiac Rehab 3x week and is now exercising 40 minutes/session at METs 2.2-2.8. He reports some walking at home but not structured exercise. He is encouraged to walk 10-20 minutes/day on days not at rehab. [facility] and because of some issues with recent back pain episodes at home. His PCP advised him to make no increase in resistance/intensity of exercise at this time. He will be having both a back Xray and Ultrasound to assess his AAA in next weeks so no change in exercise until exams are completed and PCP advises change in exercise routine.   Exercise education provided through educational video and H/O.   2/17/2025 90 day evaluation- Done early as  will be on vacation. Ponce is attending Cardiac Rehab 3x week and is now exercising 40 minutes/session at METs 2.2-2.9. He reports some walking at home but not structured exercise. He is encouraged to  walk 10-20 minutes/day on days not at rehab. Ponce had [facility] appointment [facility] because of some issues with recent back pain episodes at home.  [facility] with no significant findings. He had a ultrasound to assess AAA and results are pending. Plan small increase in intensity/resistance on SFS and UBE at this time and increase MET levels by 5-10% over next two weeks.     Other Core Components/Risk Factor Assessment:    Medication adherence  Current Medications:   Medication Documentation Review Audit       Reviewed by Dami Romero MD (Physician) on 02/17/25 at 1007      Medication Order Taking? Sig Documenting Provider Last Dose Status   acetaminophen (Tylenol) 325 mg tablet [id number] Yes Take 2 tablets (650 mg) by mouth every 4 hours if needed for mild pain (1 - 3), moderate pain (4 - 6), headaches or fever (temp greater than 38.0 C). Jase Alanis MD  Active   aspirin 81 mg chewable tablet [id number] Yes Chew 1 tablet (81 mg) once daily. Jase Alanis MD  Active   B cmplx 4/vit D3/C/folic/zinc (VITAL-D RX ORAL) [id number] Yes Take by mouth. Historical Provider, MD  Active   b complex 0.4 mg tablet [id number]  Take 1 tablet by mouth once daily.   Patient not taking: Reported on 2/17/2025    Historical Provider, MD  Active   carvedilol (Coreg) 6.25 mg tablet [id number] Yes Take 1 tablet (6.25 mg) by mouth 2 times a day. Jase Alanis MD  Active   kdjia-ko-2-dha-epa-phospho-ast (krill oil) 1,258-626-22-80 mg capsule [id number] Yes Take by mouth. Historical Provider, MD  Active   levothyroxine (Synthroid, Levoxyl) 50 mcg tablet [id number] Yes Take 1 tablet (50 mcg) by mouth early in the morning.. Take on an empty stomach at the same time each day, either 30 to 60 minutes prior to breakfast Historical Provider, MD  Active   lisinopriL-hydrochlorothiazide 10-12.5 mg tablet [id number] Yes Take 1 tablet by mouth once daily. Historical Provider, MD  Active   multivitamin with minerals tablet  [id number] Yes Take 1 tablet by mouth once daily. Historical Provider, MD  Active   nitroglycerin (Nitrostat) 0.4 mg SL tablet [id number] Yes Place 1 tablet (0.4 mg) under the tongue every 5 minutes if needed for chest pain. Historical Provider, MD  Active   POTASSIUM ORAL [id number]  Take 99 mg by mouth 2 times a day.   Patient taking differently: Take 99 mg by mouth once daily.    Historical Provider, MD  Active   ticagrelor (Brilinta) 90 mg tablet [id number] Yes Take 1 tablet (90 mg) by mouth 2 times a day. Jase Alanis MD  Active                                 Medication compliance: Yes   Uses pill box/organizer: Yes    Carries medication list: Yes     Blood Pressure Management  History of Hypertension: Yes   Medication Changes: No   Resting BP:   1/27/2025 60 day evaluation-   Visit Vitals  /68 (Patient Position: Sitting)       Heart Failure Management  Hx of Heart Failure: No    Smoking/Tobacco Assessment  Social History     Tobacco Use   Smoking Status Former    Types: Cigarettes    Passive exposure: Never   Smokeless Tobacco Never       Other Core Component Plan    Goal Status: In progress    Other Core Component Goals: Medication compliance, Verbalize medication usage and drug actions by discharge, and Pt. Carries updated medication list.     Other Core Component Interventions/Education:   Reviewed current medications, indications, dosages.  Encouraged pt. To carry updated medication list. Medication card given to fill out and carry.   Review effects of exercise on BP.  BP at cardiac rehab pre and post exercise.  Resting BP </= 120/70 per [facility].   12/30/2024 30 day evaluation-Ponce reports no medication changes since starting in the program. He is compliant with his medication,  knowledgeable on his mediation, and carries an updated medication list. He reports starting to take medications prior to Cardiac Rehab in AM and resting BP results mostly </= 120/70. Will continue to monitor. Heart failure  education provided through educational video and H/O.   Returned Cardiac Rehab pre test,  score 13/15. Reviewed with pt.   1/27/2025 60 day evaluation- Ponce reports no medication changes since starting in the program. He is compliant with his medication,  knowledgeable on his mediation, and carries an updated medication list. Resting BP results mostly </= 120/70 at Cardiac Rehab. Bps have been reported to [facility]. Will continue to monitor resting BP before and after exercise. Blood Pressure education provided through educational video and H/O.   2/17/2025 90 day evaluation- Done early as  will be on vacation. oPnce will be starting on Repatha but no other medication changes since last evaluation. He is compliant with his medication,  knowledgeable on his mediation, and carries an updated medication list. Resting BP results mostly </= 130/80 at Cardiac Rehab. BPs have been reported to [facility]. Will continue to monitor resting BP before and after exercise.   Heart Parts and Risk Factor education provided through educational videos and H/Os.    Individual Patient Goals:    Improve strength and endurance to return to exercise and do what he wants to do without SOB or fatigue.   Be able to have left knee replacement surgery in near future.     Goal Status: In progress    Staff Comments:  Reviewed cardiac rehab exercise protocol including METS and RPE.  Reviewed educational topics to be covered in Cardiac Rehab.   Explained to pt. That amount of time of exercise and resistance /intensity of exercise may be affected by the fact that patient has an active AAA that is being monitored. PCP and cardiologist will be consulted regarding limitations.  12/30/2024 30 day evaluation- Ponce reports improved strength and endurance to exercise at Cardiac rehab and do more at home with less fatigue and minimal SOB. He hopes to have left knee surgery in near future but knows he has to complete Cardiac Rehab and continue to have  AAA monitored. He reports to a recent fleeting episode of chest pressure at home that eased with rest. He was encouraged to monitor and report to cardiologist if continues. No ER visits, hospitalizations, or falls since starting in the program.   1/27/2025 60 day evaluation- Ponce reports improved strength and endurance to exercise at Cardiac rehab and do more at home with less fatigue and minimal SOB. He hopes to have left knee surgery in near future but knows he has to complete Cardiac Rehab and continue to have AAA monitored. No further chest pressure reported but he did report some back pain issues at home that is being assessed [facility]. No ER visits, hospitalizations, or falls since starting in the program.   2/17/2025 90 day evaluation- Done early as  will be on vacation. Ponce reports improved strength and endurance to exercise at Cardiac rehab and do more at home with less fatigue and minimal SOB. He hopes to have left knee surgery in near future but knows he has to complete Cardiac Rehab and continue to have AAA monitored. No further chest pressure reported but he did report some back pain issues and issues with left arm numbness at home that is being assessed [facility]. No ER visits, hospitalizations, or falls since starting in the program.     Rehab Staff Signature: Ligia Smith RN

## 2025-02-19 ENCOUNTER — CLINICAL SUPPORT (OUTPATIENT)
Dept: CARDIAC REHAB | Facility: HOSPITAL | Age: 75
End: 2025-02-19
Payer: MEDICARE

## 2025-02-19 DIAGNOSIS — Z95.5 S/P CORONARY ARTERY STENT PLACEMENT: ICD-10-CM

## 2025-02-19 PROCEDURE — 93798 PHYS/QHP OP CAR RHAB W/ECG: CPT | Performed by: INTERNAL MEDICINE

## 2025-02-21 ENCOUNTER — CLINICAL SUPPORT (OUTPATIENT)
Dept: CARDIAC REHAB | Facility: HOSPITAL | Age: 75
End: 2025-02-21
Payer: MEDICARE

## 2025-02-21 DIAGNOSIS — Z95.5 S/P CORONARY ARTERY STENT PLACEMENT: ICD-10-CM

## 2025-02-21 PROCEDURE — 93798 PHYS/QHP OP CAR RHAB W/ECG: CPT | Performed by: INTERNAL MEDICINE

## 2025-02-24 ENCOUNTER — CLINICAL SUPPORT (OUTPATIENT)
Dept: CARDIAC REHAB | Facility: HOSPITAL | Age: 75
End: 2025-02-24
Payer: MEDICARE

## 2025-02-24 DIAGNOSIS — Z95.5 S/P CORONARY ARTERY STENT PLACEMENT: ICD-10-CM

## 2025-02-24 PROCEDURE — 93798 PHYS/QHP OP CAR RHAB W/ECG: CPT | Performed by: INTERNAL MEDICINE

## 2025-02-26 ENCOUNTER — CLINICAL SUPPORT (OUTPATIENT)
Dept: CARDIAC REHAB | Facility: HOSPITAL | Age: 75
End: 2025-02-26
Payer: MEDICARE

## 2025-02-26 DIAGNOSIS — Z95.5 S/P CORONARY ARTERY STENT PLACEMENT: ICD-10-CM

## 2025-02-26 PROCEDURE — 93798 PHYS/QHP OP CAR RHAB W/ECG: CPT | Performed by: INTERNAL MEDICINE

## 2025-02-28 ENCOUNTER — CLINICAL SUPPORT (OUTPATIENT)
Dept: CARDIAC REHAB | Facility: HOSPITAL | Age: 75
End: 2025-02-28
Payer: MEDICARE

## 2025-02-28 DIAGNOSIS — Z95.5 S/P CORONARY ARTERY STENT PLACEMENT: ICD-10-CM

## 2025-02-28 PROCEDURE — 93798 PHYS/QHP OP CAR RHAB W/ECG: CPT | Performed by: INTERNAL MEDICINE

## 2025-03-03 ENCOUNTER — CLINICAL SUPPORT (OUTPATIENT)
Dept: CARDIAC REHAB | Facility: HOSPITAL | Age: 75
End: 2025-03-03
Payer: MEDICARE

## 2025-03-03 DIAGNOSIS — Z95.5 S/P CORONARY ARTERY STENT PLACEMENT: ICD-10-CM

## 2025-03-03 PROCEDURE — 93798 PHYS/QHP OP CAR RHAB W/ECG: CPT | Performed by: INTERNAL MEDICINE

## 2025-03-05 ENCOUNTER — CLINICAL SUPPORT (OUTPATIENT)
Dept: CARDIAC REHAB | Facility: HOSPITAL | Age: 75
End: 2025-03-05
Payer: MEDICARE

## 2025-03-05 DIAGNOSIS — Z95.5 S/P CORONARY ARTERY STENT PLACEMENT: ICD-10-CM

## 2025-03-05 PROCEDURE — 93798 PHYS/QHP OP CAR RHAB W/ECG: CPT | Performed by: INTERNAL MEDICINE

## 2025-03-07 ENCOUNTER — CLINICAL SUPPORT (OUTPATIENT)
Dept: CARDIAC REHAB | Facility: HOSPITAL | Age: 75
End: 2025-03-07
Payer: MEDICARE

## 2025-03-07 DIAGNOSIS — Z95.5 S/P CORONARY ARTERY STENT PLACEMENT: ICD-10-CM

## 2025-03-07 PROCEDURE — 93798 PHYS/QHP OP CAR RHAB W/ECG: CPT | Performed by: INTERNAL MEDICINE

## 2025-03-10 ENCOUNTER — DOCUMENTATION (OUTPATIENT)
Dept: CARDIAC REHAB | Facility: HOSPITAL | Age: 75
End: 2025-03-10
Payer: MEDICARE

## 2025-03-10 ENCOUNTER — CLINICAL SUPPORT (OUTPATIENT)
Dept: CARDIAC REHAB | Facility: HOSPITAL | Age: 75
End: 2025-03-10

## 2025-03-10 VITALS
HEIGHT: 69 IN | DIASTOLIC BLOOD PRESSURE: 74 MMHG | SYSTOLIC BLOOD PRESSURE: 117 MMHG | WEIGHT: 263 LBS | BODY MASS INDEX: 38.95 KG/M2

## 2025-03-10 DIAGNOSIS — Z95.5 S/P CORONARY ARTERY STENT PLACEMENT: ICD-10-CM

## 2025-03-10 PROCEDURE — 93798 PHYS/QHP OP CAR RHAB W/ECG: CPT | Performed by: INTERNAL MEDICINE

## 2025-03-10 NOTE — PROGRESS NOTES
Cardiac Rehabilitation Discharge Summary    Name: Garett Stafford  Medical Record Number: [id number]  YOB: 1950  Age: 74 y.o.    Today’s Date: 3/10/2025  Primary Care Physician: Crys Bright DO  Referring Physician: Dami Romero MD  Program Location: [facility]  Primary Diagnosis:   S/P coronary artery stent placement  ASHD      Onset/Date of Diagnosis: 11/8/2024    Session # 37    AACVPR Risk Stratification:   Moderate    Falls Risk: High  Psychosocial Assessment     Sent PH-Q 9 to MD if score > 20:  PHQ2 score +2. PHQ9  score+4.     Pt reported/currently experiencing stress: No  Patient uses stress management skills: No   History of: no history of anxiety or depression  Currently seeing a mental health provider: No  Social Support: Yes, Whom:Spouse  Quality of Life Survey:   PHQ2 score +2. PHQ9  score+4.   Learning Assessment:  Learning assessment/barriers: None  Preferred learning method: Auditory and Visual  Barriers: None  Comments:    Stages of Change:Action    Psychosocial Plan    Goal Status: Met    Psychosocial Goals: Demonstrating proper techniques for stress management    Psychosocial Interventions/Education: To be done in Cardiac Rehab.  Pt. questioned re: new stress, anxiety, or depressive symptoms.  Pt. Instructed on 3 Minute Breathing Space mindfulness exercise and other stress management strategies.  12/30/2024 30 day evaluation- Pt. questioned re: new stress, anxiety, or depressive symptoms. Ponce denies issues with anxiety or depression. Initial PHQ9 indicated minimal depression and not repeated. Ponce reports he was feeling a little down initially after stent but is feeling better and no longer an issue. He denies issues managing stress. He reports that he does not let things get to him. Stress management strategies have been reviewed.   1/27/2025 60 day evaluation- Pt. questioned re: new stress, anxiety, or depressive symptoms. Ponce denies issues managing stress,  "or issues with anxiety or depression. He reports that he does not let things get to him. Stress management strategies have been reviewed.   Stress, Anxiety, and Depression education provided through educational video and H/O.   2/17/2025 90 day evaluation- Done early as  will be on vacation. Pt. questioned re: new stress, anxiety, or depressive symptoms. Ponce denies issues managing stress, or issues with anxiety or depression. He reports that he does not let things get to him. Stress management strategies have been reviewed.   3/10/2025 Discharge Summary- Ponce has met his psychosocial goals.  Pt. questioned re: new stress, anxiety, or depressive symptoms. Ponce denies issues with anxiety or depression, or issues with stress management. He enjoys his hobby of sewing and embroidery with his wife, and reports that he does not let things get to him. Stress management strategies have been reviewed.    Nutrition Assessment:    Hyperlipidemia: Yes     Lipids:   Lab Results   Component Value Date    CHOL 246 (H) 11/08/2024    HDL 52.4 11/08/2024    TRIG 121 11/08/2024        LDL calc                                                         169                                    11/08/2024    Current Dietary Guidelines: Low fat, Low sodium  Barriers to dietary change: no    Diet Habit Survey:Block Dietary Fat Screener  Pre:  Initial Block Dietary Fat Screener returned, indicates 30-35% of calories are from fat.  Reviewed with pt. And AHA H/O \"Facts on Fat\" given.   Post:  Post Block Dietary Fat Screener returned, remains 30-35% of calories are from fat. Reviewed with pt. and strategies to reduce fat intake discussed and encouraged.     Diabetes Assessment    Lab Results   Component Value Date    HGBA1C 6.0 (H) 11/08/2024       History of Diabetes: No    Weight Management  3/10/2025 Discharge Summary-   Height: 175.3 cm (5' 9\")  Weight: 119 kg (263 lb)  BMI (Calculated): 38.82      Nutrition Plan    Goal Status: In " "progress    Nutrition Goals: Lipid Goal: HDL>45, LDL <70, Total <180, Trigs <150 and Maintain body weight  Maintain heart healthy low fat, low NA diet.     Nutrition Interventions/Education:   To be done in Cardiac Rehab.  Reviewed recent lipids, HGBA1C, weight, BMI.  Pt. Encouraged to follow heart healthy diet and strategies discussed.  Pt. Encouraged to meet with  dietician.  Booklet given\" Heart Attack, Bouncing Back\"   Block dietary fat screener given to fill out and return.   12/30/2024 30 day evaluation- No new lipids or HGBA1C.  Weight is maintained. Ponce reports making heart healthy diet choices. Metabolic Syndrome/DM and Healthy Eating education provided through educational videos and H/Os.   1/27/2025 60 day evaluation- No new lipids or HGBA1C.  Weight is maintained. Ponce reports making heart healthy diet choices. Cholesterol education provided through educational video and H/O.   2/17/2025 90 day evaluation- Done early as  will be on vacation. No new lipids or HGBA1C.  Weight is maintained. Ponce reports making heart healthy diet choices.   3/10/2025 Discharge Summary- Ponce continues to work on goal of improved total cholesterol and LDL. He is unable to tolerate statins and will be starting Repatha [facility]. No new lipids or HGBA1C drawn while in the program. Weight is maintained and Ponce reports following a heart healthy diet. Strategies for heart healthy eating have been reviewed.     Exercise Assessment    No  Mode: NA  Frequency: NA  Duration: NA    Exercise Prescription     Exercise Prescription based on: Duke Activity Status Index (DASI)  DASI Score:  PRE- 13.45   Discharge- 13.45  MET Score: PRE- 4.4         Discharge- 4.4   Frequency:  3 days/week   Mode: Treadmill, Recumbent Cycle, Arm Ergometer, and SciFitStepper   Duration: 30-40 total aerobic minutes   Intensity: RPE 11-13  Target HR:     MET Level: 3.0-6.0  Patient wears supplemental O2: No     Modality Workload " METs Duration (minutes)   1 Pre-Exercise   5:00   2 Treadmill 1.7MPH  2.3 10:00   3 Recumbent Bike 4 2.8 10:00   4 Arm Ergometer/UEE UBE 3.0 3.2 10:00   5 Sci Fit Stepper 3.0 3.1 10:00   6 Post-Exercise   5:00     Resistance Training: Yes   Home Exercise Prescription given:  Given at discharge.    Exercise Plan    Goal Status: Met    Exercise Goals: Develop home exercise program 150 total minutes moderate intensity cardiac exercise weekly by end of cardiac rehab program.  30 minutes cardio exercise most days by end of program.  Cardiac Rehab goal total 40 minutes exercise/session with increased duration by 1-2 minutes per modality for total 40 minutes/session.  Cardiac Rehab goal increased exercise intensity on TM/NS/RB/UEE based on pts. HR/BP/RPE/pain response to exercise.   Amount of time of exercise and resistance /intensity of exercise may be affected by the fact that patient has an active AAA that is being monitored. PCP and cardiologist will be consulted regarding limitations.    Exercise Interventions/Education:   To be done in Cardiac Rehab.  Reviewed cardiac rehab exercise protocol including METS and RPE.  Exercise prescription based on DASI score. Explained to pt. That amount of time of exercise and resistance /intensity of exercise may be affected by the fact that patient has an active AAA that is being monitored. PCP and cardiologist will be consulted regarding limitations.  Encouraged home exercise 30 minutes/day by end of rehab program.   12/30/2024 30 day evaluation- Ponce is attending Cardiac Rehab 3x week and is now exercising 40 minutes/session at METs 1.9-2.8. He reports some walking at home but not structured exercise. He is encouraged to walk 10-20 minutes/day on days not at rehab. Plan to slowly increase intensity of exercise at rehab to moderate, R/T issues with AAA that is being monitored by PCP.   1/27/2025 60 day evaluation- Ponce is attending Cardiac Rehab 3x week and is now exercising 40  minutes/session at METs 2.2-2.8. He reports some walking at home but not structured exercise. He is encouraged to walk 10-20 minutes/day on days not at rehab. [facility] and because of some issues with recent back pain episodes at home. His PCP advised him to make no increase in resistance/intensity of exercise at this time. He will be having both a back Xray and Ultrasound to assess his AAA in next weeks so no change in exercise until exams are completed and PCP advises change in exercise routine.   Exercise education provided through educational video and H/O.   2/17/2025 90 day evaluation- Done early as  will be on vacation. Ponce is attending Cardiac Rehab 3x week and is now exercising 40 minutes/session at METs 2.2-2.9. He reports some walking at home but not structured exercise. He is encouraged to walk 10-20 minutes/day on days not at rehab. Ponce had recent appointment with [facility] because of some issues with recent back pain episodes at home.  [facility] with no significant findings. He had a ultrasound to assess AAA and results are pending. Plan small increase in intensity/resistance on SFS and UBE at this time and increase MET levels by 5-10% over next two weeks.   3/10/2025 Discharge Summary- Ponce has met his exercise goals. He attended 37 sessions of Cardiac Rehab,including initial assessment, and most recently is exercising 40 minutes/session at METs 2.3-3.2. Discharge DASI score unchanged from initial score but exercise intensity has been gradually increased per [facility] R/T back issues and ongoing AAA that continues to be monitored, along with patient's tolerance. Ponce plans to maintain 150 minutes of moderate intensity cardiac exercise weekly by returning for phase 3 maintenance cardiac rehab.     Other Core Components/Risk Factor Assessment:    Medication adherence  Current Medications:   Medication Documentation Review Audit        Reviewed by Dami Romero MD (Physician) on 02/17/25 at 1007      Medication Order Taking? Sig Documenting Provider Last Dose Status   acetaminophen (Tylenol) 325 mg tablet [id number] Yes Take 2 tablets (650 mg) by mouth every 4 hours if needed for mild pain (1 - 3), moderate pain (4 - 6), headaches or fever (temp greater than 38.0 C). Jase Alanis MD  Active   aspirin 81 mg chewable tablet [id number] Yes Chew 1 tablet (81 mg) once daily. Jase Alanis MD  Active   B cmplx 4/vit D3/C/folic/zinc (VITAL-D RX ORAL) [id number] Yes Take by mouth. Historical Provider, MD  Active   b complex 0.4 mg tablet [id number]  Take 1 tablet by mouth once daily.   Patient not taking: Reported on 2/17/2025    Historical Provider, MD  Active   carvedilol (Coreg) 6.25 mg tablet [id number] Yes Take 1 tablet (6.25 mg) by mouth 2 times a day. Jase Alanis MD  Active   ttikp-uh-9-dha-epa-phospho-ast (krill oil) 1,[id number] mg capsule [id number] Yes Take by mouth. Historical Provider, MD  Active   levothyroxine (Synthroid, Levoxyl) 50 mcg tablet [id number] Yes Take 1 tablet (50 mcg) by mouth early in the morning.. Take on an empty stomach at the same time each day, either 30 to 60 minutes prior to breakfast Historical Provider, MD  Active   lisinopriL-hydrochlorothiazide 10-12.5 mg tablet [id number] Yes Take 1 tablet by mouth once daily. Historical Provider, MD  Active   multivitamin with minerals tablet [id number] Yes Take 1 tablet by mouth once daily. Historical Provider, MD  Active   nitroglycerin (Nitrostat) 0.4 mg SL tablet [id number] Yes Place 1 tablet (0.4 mg) under the tongue every 5 minutes if needed for chest pain. Historical Provider, MD  Active   POTASSIUM ORAL [id number]  Take 99 mg by mouth 2 times a day.   Patient taking differently: Take 99 mg by mouth once daily.    Historical Provider, MD  Active   ticagrelor (Brilinta) 90 mg tablet [id number] Yes Take 1 tablet (90 mg) by mouth 2 times a day. Jase Alanis MD  Active                                  Medication compliance: Yes   Uses pill box/organizer: Yes    Carries medication list: Yes     Blood Pressure Management  History of Hypertension: Yes   Medication Changes: No   Resting BP:     3/10/2025 Discharge Summary- Visit Vitals  /74 (Patient Position: Sitting)       Heart Failure Management  Hx of Heart Failure: No    Smoking/Tobacco Assessment  Social History     Tobacco Use   Smoking Status Former    Types: Cigarettes    Passive exposure: Never   Smokeless Tobacco Never       Other Core Component Plan    Goal Status: Met    Other Core Component Goals: Medication compliance, Verbalize medication usage and drug actions by discharge, and Pt. Carries updated medication list.     Other Core Component Interventions/Education:   Reviewed current medications, indications, dosages.  Encouraged pt. To carry updated medication list. Medication card given to fill out and carry.   Review effects of exercise on BP.  BP at cardiac rehab pre and post exercise.  Resting BP </= 120/70 per [facility].   12/30/2024 30 day evaluation-Ponce reports no medication changes since starting in the program. He is compliant with his medication,  knowledgeable on his mediation, and carries an updated medication list. He reports starting to take medications prior to Cardiac Rehab in AM and resting BP results mostly </= 120/70. Will continue to monitor.   Heart failure education provided through educational video and H/O.   Returned Cardiac Rehab pre test,  score 13/15. Reviewed with pt.   1/27/2025 60 day evaluation- Ponce reports no medication changes since starting in the program. He is compliant with his medication,  knowledgeable on his mediation, and carries an updated medication list. Resting BP results mostly </= 120/70 at Cardiac Rehab. Bps have been reported to [facility]. Will continue to monitor resting BP before and after exercise.   Blood Pressure education provided through educational video and H/O.    2/17/2025 90 day evaluation- Done early as  will be on vacation. Ponce will be starting on Repatha but no other medication changes since last evaluation. He is compliant with his medication,  knowledgeable on his mediation, and carries an updated medication list. Resting BP results mostly </= 130/80 at Cardiac Rehab. BPs have been reported to [facility]. Will continue to monitor resting BP before and after exercise.   Heart Parts and Risk Factor education provided through educational videos and H/Os.  3/10/2025 Discharge Summary- Ponce has met his core component goals. He is compliant with his medication, knowledgeable on his medication, and carries an updated medication list. He will start on Repatha if [facility]. No other medication changes while in the program. Resting BPs at Cardiac Rehab since last evaluation range 110s-120s/60-70s.   Cardiac Rehab post test returned, score 11/15. Reviewed with pt. on 3/7/2025.     Individual Patient Goals:    Improve strength and endurance to return to exercise and do what he wants to do without SOB or fatigue.   Be able to have left knee replacement surgery in near future.     Goal Status: Met    Staff Comments:  Reviewed cardiac rehab exercise protocol including METS and RPE.  Reviewed educational topics to be covered in Cardiac Rehab.   Explained to pt. That amount of time of exercise and resistance /intensity of exercise may be affected by the fact that patient has an active AAA that is being monitored. PCP and cardiologist will be consulted regarding limitations.  12/30/2024 30 day evaluation- Ponce reports improved strength and endurance to exercise at Cardiac rehab and do more at home with less fatigue and minimal SOB. He hopes to have left knee surgery in near future but knows he has to complete Cardiac Rehab and continue to have AAA monitored. He reports to a recent fleeting episode of chest pressure at home that eased with rest. He was encouraged to  monitor and report to cardiologist if continues. No ER visits, hospitalizations, or falls since starting in the program.   1/27/2025 60 day evaluation- Ponce reports improved strength and endurance to exercise at Cardiac rehab and do more at home with less fatigue and minimal SOB. He hopes to have left knee surgery in near future but knows he has to complete Cardiac Rehab and continue to have AAA monitored. No further chest pressure reported but he did report some back pain issues at home that is being assessed [facility]. No ER visits, hospitalizations, or falls since starting in the program.   2/17/2025 90 day evaluation- Done early as  will be on vacation. Ponce reports improved strength and endurance to exercise at Cardiac rehab and do more at home with less fatigue and minimal SOB. He hopes to have left knee surgery in near future but knows he has to complete Cardiac Rehab and continue to have AAA monitored. No further chest pressure reported but he did report some back pain issues and issues with left arm numbness at home that is being assessed by [facility]. No ER visits, hospitalizations, or falls since starting in the program.   3/10/2025 Discharge Summary- Ponce has met his goal of improved strength and endurance and improved SOB and fatigue with exercise and activity. Ponce plans to maintain 150 minutes of moderate intensity cardiac exercise weekly by returning for phase 3 maintenance cardiac rehab. AAA [facility] to [facility] and Ponce hopes to be cleared for left knee replacement surgery. No ER visits, hospitalizations or falls while in the program.       Rehab Staff Signature: Ligia Smith RN

## 2025-03-11 ENCOUNTER — APPOINTMENT (OUTPATIENT)
Dept: CARDIAC REHAB | Facility: HOSPITAL | Age: 75
End: 2025-03-11

## 2025-03-12 ENCOUNTER — APPOINTMENT (OUTPATIENT)
Dept: CARDIAC REHAB | Facility: HOSPITAL | Age: 75
End: 2025-03-12
Payer: MEDICARE

## 2025-03-14 ENCOUNTER — APPOINTMENT (OUTPATIENT)
Dept: CARDIAC REHAB | Facility: HOSPITAL | Age: 75
End: 2025-03-14
Payer: MEDICARE

## 2025-03-17 ENCOUNTER — APPOINTMENT (OUTPATIENT)
Dept: CARDIAC REHAB | Facility: HOSPITAL | Age: 75
End: 2025-03-17
Payer: OTHER GOVERNMENT

## 2025-03-19 ENCOUNTER — APPOINTMENT (OUTPATIENT)
Dept: CARDIAC REHAB | Facility: HOSPITAL | Age: 75
End: 2025-03-19
Payer: MEDICARE

## 2025-04-08 ENCOUNTER — APPOINTMENT (OUTPATIENT)
Dept: CARDIAC REHAB | Facility: HOSPITAL | Age: 75
End: 2025-04-08

## 2025-04-08 PROCEDURE — 9430000001 HC PHASE III CARDIAC REHAB MONTHLY FEE

## 2025-05-08 ENCOUNTER — APPOINTMENT (OUTPATIENT)
Dept: CARDIAC REHAB | Facility: HOSPITAL | Age: 75
End: 2025-05-08

## 2025-05-08 PROCEDURE — 9430000001 HC PHASE III CARDIAC REHAB MONTHLY FEE

## 2025-05-15 NOTE — H&P (VIEW-ONLY)
"Counseling:  The patient was counseled regarding diagnostic results, instructions for management, risk factor reductions, prognosis, patient and family education, impressions, risks and benefits of treatment options and importance of compliance with treatment.      Chief Complaint:   The patient presents today for 3-month followup of CAD, CHB and HTN.      History Of Present Illness:    Garett Stafford is a 75 y.o. male who presents today for 3-month followup of CAD, CHB and HTN. His PMH is significant for CAD s/p multiple prior PCI with h/o NSTEMI s/p PCI of LAD 11/08/2024, CHB s/p PPM 07/2021, AAA, dyslipidemia, HTN, and aortic regurgitation. Over the past 3 months, the patient states that he has been experiencing a dull ache in his left arm with exertion, exertional SOB, night sweats and diaphoresis. He indicates that he has had use his nitroglycerin. BP has been stable. The patient is compliant with his prescribed medications.      Last Recorded Vitals:  Vitals:    05/16/25 1401   Weight: 120 kg (265 lb)   Height: 1.778 m (5' 10\")       Past Surgical History:  He has a past surgical history that includes Cardiac surgery; Appendectomy; Tonsillectomy; Joint replacement; Cardiac catheterization (N/A, 11/08/2024); Cardiac catheterization (N/A, 11/08/2024); Cardiac catheterization (N/A, 11/08/2024); Cardiac catheterization (N/A, 11/08/2024); and Coronary stent placement.      Social History:  He reports that he has quit smoking. His smoking use included cigarettes. He has never been exposed to tobacco smoke. He has never used smokeless tobacco. He reports current alcohol use. He reports that he does not use drugs.    Family History:  Family History   Problem Relation Name Age of Onset    Diabetes Mother          Allergies:  Adhesive tape-silicones, Colestipol, Demerol [meperidine], Lipitor [atorvastatin], Simvastatin, Titanium, Augmentin [amoxicillin-pot clavulanate], Aspirin, Fenofibrate, Magnesium, Niacin, Adhes. " band-tape-benzalkonium, and Hydrocodone-acetaminophen    Outpatient Medications:  Current Outpatient Medications   Medication Instructions    acetaminophen (TYLENOL) 650 mg, oral, Every 4 hours PRN    aspirin 81 mg, oral, Daily    B cmplx 4/vit D3/C/folic/zinc (VITAL-D RX ORAL) Take by mouth.    b complex 0.4 mg tablet 1 tablet, Daily    Brilinta 90 mg, oral, 2 times daily    carvedilol (Coreg) 6.25 mg tablet Take 1 tablet (6.25 mg) by mouth 2 times a day.    krill-om-3-dha-epa-phospho-ast (krill oil) 1,039-286-99-80 mg capsule Take by mouth.    levothyroxine (SYNTHROID, LEVOXYL) 50 mcg, Daily    lisinopriL-hydrochlorothiazide 10-12.5 mg tablet 1 tablet, Daily    multivitamin with minerals tablet 1 tablet, Daily    nitroglycerin (NITROSTAT) 0.4 mg, Every 5 min PRN    POTASSIUM ORAL 99 mg, 2 times daily (0900,1400)    Repatha SureClick 140 mg, subcutaneous, Every 14 days     Review of Systems   Cardiovascular:  Positive for dyspnea on exertion.        Diaphoresis. Left arm pain with exertion.   Endocrine:        Night sweats   All other systems reviewed and are negative.     Physical Exam:  Constitutional:       Appearance: Healthy appearance. Not in distress.   Neck:      Vascular: No JVR. JVD normal.   Pulmonary:      Effort: Pulmonary effort is normal.      Breath sounds: Normal breath sounds. No wheezing. No rhonchi. No rales.   Chest:      Chest wall: Not tender to palpatation.   Cardiovascular:      PMI at left midclavicular line. Normal rate. Regular rhythm. Normal S1. Normal S2.       Murmurs: There is no murmur.      No gallop.  No click. No rub.   Pulses:     Intact distal pulses.   Edema:     Peripheral edema absent.   Abdominal:      General: Bowel sounds are normal.      Palpations: Abdomen is soft.      Tenderness: There is no abdominal tenderness.   Musculoskeletal: Normal range of motion.         General: No tenderness. Skin:     General: Skin is warm and dry.   Neurological:      General: No focal  deficit present.      Mental Status: Alert and oriented to person, place and time.          Last Labs:  CBC -  Lab Results   Component Value Date    WBC 8.3 11/09/2024    HGB 14.1 11/09/2024    HCT 42.1 11/09/2024    MCV 94 11/09/2024     11/09/2024       CMP -  Lab Results   Component Value Date    CALCIUM 9.0 11/09/2024    PHOS 2.4 (L) 11/09/2024    PROT 6.4 11/09/2024    ALBUMIN 3.9 11/09/2024    AST 19 11/09/2024    ALT 12 11/09/2024    ALKPHOS 68 11/09/2024    BILITOT 0.6 11/09/2024       LIPID PANEL -   Lab Results   Component Value Date    CHOL 246 (H) 11/08/2024    TRIG 121 11/08/2024    HDL 52.4 11/08/2024    CHHDL 4.7 11/08/2024    VLDL 24 11/08/2024    NHDL 194 (H) 11/08/2024       RENAL FUNCTION PANEL -   Lab Results   Component Value Date    GLUCOSE 134 (H) 11/09/2024     11/09/2024    K 3.8 11/09/2024     11/09/2024    CO2 26 11/09/2024    ANIONGAP 11 11/09/2024    BUN 13 11/09/2024    CREATININE 1.10 11/09/2024    CALCIUM 9.0 11/09/2024    PHOS 2.4 (L) 11/09/2024    ALBUMIN 3.9 11/09/2024        Lab Results   Component Value Date    BNP 29 11/08/2024    HGBA1C 6.0 (H) 11/08/2024       Last Cardiology Tests:  11/08/2024 - Cardiac Catheterization (LH)  1. Double vessel disease.  2. Severe proximal LAD stenosis - successfully treated with OCT guided PCI of the LAD.  3.  of the RCA with mature collaterals from the left.  4. High dose statin therapy.  5. DAPT for 12 months.  6. To consider PCI of the  if symptoms persist.    11/08/2024 - TTE  1. Poorly visualized anatomical structures due to suboptimal image quality.  2. The left ventricular systolic function is low normal, with a visually estimated ejection fraction of 50-55%.  3. Entire anterior septum, apical lateral segment, and apex are abnormal.  4. Left ventricular diastolic filling was indeterminate.  5. Left ventricular cavity size is mildly dilated.  6. There is normal right ventricular global systolic function.  7.  Moderately elevated right ventricular systolic pressure.  8. Aortic valve stenosis is not present.  9. Mild aortic valve regurgitation.  10. There is moderately increased septal thickness.    10/30/2024 - CTA Chest/Abdomen/Pelvis  CHEST:  1.  No dissection or aneurysm of the thoracic aorta. No pulmonary emboli.  2. There is a small area of scarring or fibrosis at the base of the left lower lobe with stable 4 mm pulmonary nodule in right middle lobe.  ABDOMEN-PELVIS:  1.  There is fusiform aneurysm of the mid and distal abdominal aorta with distal abdominal aortic aneurysm measuring 4.6 cm in transverse dimension and 5.0 cm in AP dimension. Previously, the aneurysm measured 4.2 x 4.8 cm in size.  2. Mild prostatic enlargement.  3. Atrophic pancreas.    07/25/2021 - Electrophysiology Procedure  Successful fluoroscopy guidance for confirmation of Temporary Pacemaker.     07/22/2021 - TTE  1. The left ventricular systolic function is mildly decreased with a 45-50% estimated ejection fraction.  2. Moderately enlarged right ventricle.  3. There is moderately reduced right ventricular systolic function.  4. The left atrium is moderately dilated.  5. There is global hypokinesis of the left ventricle with minor regional variations.     Lab review: I have personally reviewed the laboratory result(s).    Assessment/Plan   1) CAD s/p Multiple Prior PCI - NSTEMI s/p PCI of LAD 11/2024 - Now with Symptoms of Unstable Angina   On DAPT - ASA 81 mg daily, Brilinta 90 mg BID  On carvedilol 6.25 mg BID, lisinopril-HCTZ 10-12.5 mg daily  Allergy listed to atorvastatin and simvastatin  Hospital admission 11/08/2024 to 11/09/2024 with NSTEMI  TTE with LVEF 50-55%, normal RV systolic function, moderately elevated RVSP  [facility] with double vessel disease, severe proximal LAD stenosis,  of RCA with mature collaterals from the left s/p PCI of LAD  To consider PCI of  if symptoms persist   Reports dull ache in left arm with  "exertion  Reports exertional SOB  Reports night sweats and diaphoresis  Has used SL nitro   BP stable  Continue current medical Rx   Advised to avoid heavy exertion   Plan for CC/PTCA- Urgent in the Setting of Unstable Angina, Schedule for Monday/Tuesday, May 19/20  Risks, benefits, alternatives of cardiac catheterization possible angioplasty and stenting including risk of death, stroke, MI, bleeding complications and renal failure were explained to patient and patient agreed to proceed.       2) Dyslipidemia  Goal LDL <70  Allergy listed to atorvastatin and simvastatin - \"unable to use left arm\"  Lipid panel 11/08/2024 with LDL of 169 - subsequently Rx Repatha      3) CHB s/p [employer] PPM July 2021   Device is plastic coated d/t metal allergies, including titanium   [facility]     4) HTN   Stable  On carvedilol 6.25 mg BID, lisinopril-HCTZ 10-12.5 mg daily   Continue current medical Rx      5) Aortic Regurgitation  TTE 11/08/2024 with mild aortic regurgitation   BP stable      6) Abdominal Aortic Aneurysm  [facility]  CTA chest/abdomen/pelvis 10/30/2024 with fusiform aneurysm of mid and distal abdominal aorta with distal abdominal aortic aneurysm measuring 4.6 cm in transverse dimension and 5.0 cm in AP dimension; previously, aneurysm measured 4.2 x 4.8 cm in size.        Scribe Attestation  By signing my name below, INidia Scribe   attest that this documentation has been prepared under the direction and in the presence of Dami Romero MD.   "

## 2025-05-15 NOTE — PROGRESS NOTES
"Counseling:  The patient was counseled regarding diagnostic results, instructions for management, risk factor reductions, prognosis, patient and family education, impressions, risks and benefits of treatment options and importance of compliance with treatment.      Chief Complaint:   The patient presents today for 3-month followup of CAD, CHB and HTN.      History Of Present Illness:    Garett Stafford is a 75 y.o. male who presents today for 3-month followup of CAD, CHB and HTN. His PMH is significant for CAD s/p multiple prior PCI with h/o NSTEMI s/p PCI of LAD 11/08/2024, CHB s/p PPM 07/2021, AAA, dyslipidemia, HTN, and aortic regurgitation. Over the past 3 months, the patient states that he has been experiencing a dull ache in his left arm with exertion, exertional SOB, night sweats and diaphoresis. He indicates that he has had use his nitroglycerin. BP has been stable. The patient is compliant with his prescribed medications.      Last Recorded Vitals:  Vitals:    05/16/25 1401   Weight: 120 kg (265 lb)   Height: 1.778 m (5' 10\")       Past Surgical History:  He has a past surgical history that includes Cardiac surgery; Appendectomy; Tonsillectomy; Joint replacement; Cardiac catheterization (N/A, 11/08/2024); Cardiac catheterization (N/A, 11/08/2024); Cardiac catheterization (N/A, 11/08/2024); Cardiac catheterization (N/A, 11/08/2024); and Coronary stent placement.      Social History:  He reports that he has quit smoking. His smoking use included cigarettes. He has never been exposed to tobacco smoke. He has never used smokeless tobacco. He reports current alcohol use. He reports that he does not use drugs.    Family History:  Family History   Problem Relation Name Age of Onset    Diabetes Mother          Allergies:  Adhesive tape-silicones, Colestipol, Demerol [meperidine], Lipitor [atorvastatin], Simvastatin, Titanium, Augmentin [amoxicillin-pot clavulanate], Aspirin, Fenofibrate, Magnesium, Niacin, Adhes. " band-tape-benzalkonium, and Hydrocodone-acetaminophen    Outpatient Medications:  Current Outpatient Medications   Medication Instructions    acetaminophen (TYLENOL) 650 mg, oral, Every 4 hours PRN    aspirin 81 mg, oral, Daily    B cmplx 4/vit D3/C/folic/zinc (VITAL-D RX ORAL) Take by mouth.    b complex 0.4 mg tablet 1 tablet, Daily    Brilinta 90 mg, oral, 2 times daily    carvedilol (Coreg) 6.25 mg tablet Take 1 tablet (6.25 mg) by mouth 2 times a day.    krill-om-3-dha-epa-phospho-ast (krill oil) 1,019-478-47-80 mg capsule Take by mouth.    levothyroxine (SYNTHROID, LEVOXYL) 50 mcg, Daily    lisinopriL-hydrochlorothiazide 10-12.5 mg tablet 1 tablet, Daily    multivitamin with minerals tablet 1 tablet, Daily    nitroglycerin (NITROSTAT) 0.4 mg, Every 5 min PRN    POTASSIUM ORAL 99 mg, 2 times daily (0900,1400)    Repatha SureClick 140 mg, subcutaneous, Every 14 days     Review of Systems   Cardiovascular:  Positive for dyspnea on exertion.        Diaphoresis. Left arm pain with exertion.   Endocrine:        Night sweats   All other systems reviewed and are negative.     Physical Exam:  Constitutional:       Appearance: Healthy appearance. Not in distress.   Neck:      Vascular: No JVR. JVD normal.   Pulmonary:      Effort: Pulmonary effort is normal.      Breath sounds: Normal breath sounds. No wheezing. No rhonchi. No rales.   Chest:      Chest wall: Not tender to palpatation.   Cardiovascular:      PMI at left midclavicular line. Normal rate. Regular rhythm. Normal S1. Normal S2.       Murmurs: There is no murmur.      No gallop.  No click. No rub.   Pulses:     Intact distal pulses.   Edema:     Peripheral edema absent.   Abdominal:      General: Bowel sounds are normal.      Palpations: Abdomen is soft.      Tenderness: There is no abdominal tenderness.   Musculoskeletal: Normal range of motion.         General: No tenderness. Skin:     General: Skin is warm and dry.   Neurological:      General: No focal  deficit present.      Mental Status: Alert and oriented to person, place and time.          Last Labs:  CBC -  Lab Results   Component Value Date    WBC 8.3 11/09/2024    HGB 14.1 11/09/2024    HCT 42.1 11/09/2024    MCV 94 11/09/2024     11/09/2024       CMP -  Lab Results   Component Value Date    CALCIUM 9.0 11/09/2024    PHOS 2.4 (L) 11/09/2024    PROT 6.4 11/09/2024    ALBUMIN 3.9 11/09/2024    AST 19 11/09/2024    ALT 12 11/09/2024    ALKPHOS 68 11/09/2024    BILITOT 0.6 11/09/2024       LIPID PANEL -   Lab Results   Component Value Date    CHOL 246 (H) 11/08/2024    TRIG 121 11/08/2024    HDL 52.4 11/08/2024    CHHDL 4.7 11/08/2024    VLDL 24 11/08/2024    NHDL 194 (H) 11/08/2024       RENAL FUNCTION PANEL -   Lab Results   Component Value Date    GLUCOSE 134 (H) 11/09/2024     11/09/2024    K 3.8 11/09/2024     11/09/2024    CO2 26 11/09/2024    ANIONGAP 11 11/09/2024    BUN 13 11/09/2024    CREATININE 1.10 11/09/2024    CALCIUM 9.0 11/09/2024    PHOS 2.4 (L) 11/09/2024    ALBUMIN 3.9 11/09/2024        Lab Results   Component Value Date    BNP 29 11/08/2024    HGBA1C 6.0 (H) 11/08/2024       Last Cardiology Tests:  11/08/2024 - Cardiac Catheterization (LH)  1. Double vessel disease.  2. Severe proximal LAD stenosis - successfully treated with OCT guided PCI of the LAD.  3.  of the RCA with mature collaterals from the left.  4. High dose statin therapy.  5. DAPT for 12 months.  6. To consider PCI of the  if symptoms persist.    11/08/2024 - TTE  1. Poorly visualized anatomical structures due to suboptimal image quality.  2. The left ventricular systolic function is low normal, with a visually estimated ejection fraction of 50-55%.  3. Entire anterior septum, apical lateral segment, and apex are abnormal.  4. Left ventricular diastolic filling was indeterminate.  5. Left ventricular cavity size is mildly dilated.  6. There is normal right ventricular global systolic function.  7.  Moderately elevated right ventricular systolic pressure.  8. Aortic valve stenosis is not present.  9. Mild aortic valve regurgitation.  10. There is moderately increased septal thickness.    10/30/2024 - CTA Chest/Abdomen/Pelvis  CHEST:  1.  No dissection or aneurysm of the thoracic aorta. No pulmonary emboli.  2. There is a small area of scarring or fibrosis at the base of the left lower lobe with stable 4 mm pulmonary nodule in right middle lobe.  ABDOMEN-PELVIS:  1.  There is fusiform aneurysm of the mid and distal abdominal aorta with distal abdominal aortic aneurysm measuring 4.6 cm in transverse dimension and 5.0 cm in AP dimension. Previously, the aneurysm measured 4.2 x 4.8 cm in size.  2. Mild prostatic enlargement.  3. Atrophic pancreas.    07/25/2021 - Electrophysiology Procedure  Successful fluoroscopy guidance for confirmation of Temporary Pacemaker.     07/22/2021 - TTE  1. The left ventricular systolic function is mildly decreased with a 45-50% estimated ejection fraction.  2. Moderately enlarged right ventricle.  3. There is moderately reduced right ventricular systolic function.  4. The left atrium is moderately dilated.  5. There is global hypokinesis of the left ventricle with minor regional variations.     Lab review: I have personally reviewed the laboratory result(s).    Assessment/Plan   1) CAD s/p Multiple Prior PCI - NSTEMI s/p PCI of LAD 11/2024 - Now with Symptoms of Unstable Angina   On DAPT - ASA 81 mg daily, Brilinta 90 mg BID  On carvedilol 6.25 mg BID, lisinopril-HCTZ 10-12.5 mg daily  Allergy listed to atorvastatin and simvastatin  Hospital admission 11/08/2024 to 11/09/2024 with NSTEMI  TTE with LVEF 50-55%, normal RV systolic function, moderately elevated RVSP  [facility] with double vessel disease, severe proximal LAD stenosis,  of RCA with mature collaterals from the left s/p PCI of LAD  To consider PCI of  if symptoms persist   Reports dull ache in left arm with  "exertion  Reports exertional SOB  Reports night sweats and diaphoresis  Has used SL nitro   BP stable  Continue current medical Rx   Advised to avoid heavy exertion   Plan for CC/PTCA- Urgent in the Setting of Unstable Angina, Schedule for Monday/Tuesday, May 19/20  Risks, benefits, alternatives of cardiac catheterization possible angioplasty and stenting including risk of death, stroke, MI, bleeding complications and renal failure were explained to patient and patient agreed to proceed.       2) Dyslipidemia  Goal LDL <70  Allergy listed to atorvastatin and simvastatin - \"unable to use left arm\"  Lipid panel 11/08/2024 with LDL of 169 - subsequently Rx Repatha      3) CHB s/p Ev[employer] PPM July 2021   Device is plastic coated d/t metal allergies, including titanium   [facility]     4) HTN   Stable  On carvedilol 6.25 mg BID, lisinopril-HCTZ 10-12.5 mg daily   Continue current medical Rx      5) Aortic Regurgitation  TTE 11/08/2024 with mild aortic regurgitation   BP stable      6) Abdominal Aortic Aneurysm  [facility]  CTA chest/abdomen/pelvis 10/30/2024 with fusiform aneurysm of mid and distal abdominal aorta with distal abdominal aortic aneurysm measuring 4.6 cm in transverse dimension and 5.0 cm in AP dimension; previously, aneurysm measured 4.2 x 4.8 cm in size.        Scribe Attestation  By signing my name below, INidia Scribe   attest that this documentation has been prepared under the direction and in the presence of Dami Romero MD.   "

## 2025-05-16 ENCOUNTER — TELEPHONE (OUTPATIENT)
Dept: CARDIOLOGY | Facility: HOSPITAL | Age: 75
End: 2025-05-16

## 2025-05-16 ENCOUNTER — OFFICE VISIT (OUTPATIENT)
Dept: CARDIOLOGY | Facility: HOSPITAL | Age: 75
End: 2025-05-16
Payer: MEDICARE

## 2025-05-16 ENCOUNTER — LAB (OUTPATIENT)
Dept: LAB | Facility: HOSPITAL | Age: 75
End: 2025-05-16
Payer: MEDICARE

## 2025-05-16 VITALS — WEIGHT: 265 LBS | HEIGHT: 70 IN | BODY MASS INDEX: 37.94 KG/M2

## 2025-05-16 DIAGNOSIS — I20.0 UNSTABLE ANGINA (MULTI): ICD-10-CM

## 2025-05-16 DIAGNOSIS — Z95.0 PACEMAKER: ICD-10-CM

## 2025-05-16 DIAGNOSIS — I25.10 CAD IN NATIVE ARTERY: ICD-10-CM

## 2025-05-16 DIAGNOSIS — I25.9 CHEST PAIN DUE TO MYOCARDIAL ISCHEMIA, UNSPECIFIED ISCHEMIC CHEST PAIN TYPE: Primary | ICD-10-CM

## 2025-05-16 DIAGNOSIS — I25.9 CHRONIC ISCHEMIC HEART DISEASE, UNSPECIFIED: Primary | ICD-10-CM

## 2025-05-16 DIAGNOSIS — I10 ESSENTIAL (PRIMARY) HYPERTENSION: ICD-10-CM

## 2025-05-16 DIAGNOSIS — I25.10 ATHEROSCLEROTIC HEART DISEASE OF NATIVE CORONARY ARTERY WITHOUT ANGINA PECTORIS: ICD-10-CM

## 2025-05-16 DIAGNOSIS — Z95.0 PRESENCE OF CARDIAC PACEMAKER: ICD-10-CM

## 2025-05-16 DIAGNOSIS — I10 HYPERTENSION, UNSPECIFIED TYPE: ICD-10-CM

## 2025-05-16 DIAGNOSIS — I35.1 AORTIC VALVE INSUFFICIENCY, ETIOLOGY OF CARDIAC VALVE DISEASE UNSPECIFIED: ICD-10-CM

## 2025-05-16 DIAGNOSIS — E78.5 HYPERLIPIDEMIA, UNSPECIFIED: ICD-10-CM

## 2025-05-16 DIAGNOSIS — E78.5 DYSLIPIDEMIA: ICD-10-CM

## 2025-05-16 DIAGNOSIS — I35.1 NONRHEUMATIC AORTIC (VALVE) INSUFFICIENCY: ICD-10-CM

## 2025-05-16 LAB
ANION GAP SERPL CALC-SCNC: 13 MMOL/L (ref 10–20)
BUN SERPL-MCNC: 20 MG/DL (ref 6–23)
CALCIUM SERPL-MCNC: 9.9 MG/DL (ref 8.6–10.3)
CHLORIDE SERPL-SCNC: 103 MMOL/L (ref 98–107)
CO2 SERPL-SCNC: 29 MMOL/L (ref 21–32)
CREAT SERPL-MCNC: 1.06 MG/DL (ref 0.5–1.3)
EGFRCR SERPLBLD CKD-EPI 2021: 72 ML/MIN/1.73M*2
ERYTHROCYTE [DISTWIDTH] IN BLOOD BY AUTOMATED COUNT: 13.9 % (ref 11.5–14.5)
GLUCOSE SERPL-MCNC: 97 MG/DL (ref 74–99)
HCT VFR BLD AUTO: 42.1 % (ref 41–52)
HGB BLD-MCNC: 13.7 G/DL (ref 13.5–17.5)
MCH RBC QN AUTO: 31.4 PG (ref 26–34)
MCHC RBC AUTO-ENTMCNC: 32.5 G/DL (ref 32–36)
MCV RBC AUTO: 96 FL (ref 80–100)
NRBC BLD-RTO: 0 /100 WBCS (ref 0–0)
PLATELET # BLD AUTO: 197 X10*3/UL (ref 150–450)
POTASSIUM SERPL-SCNC: 4.2 MMOL/L (ref 3.5–5.3)
RBC # BLD AUTO: 4.37 X10*6/UL (ref 4.5–5.9)
SODIUM SERPL-SCNC: 141 MMOL/L (ref 136–145)
WBC # BLD AUTO: 5.9 X10*3/UL (ref 4.4–11.3)

## 2025-05-16 PROCEDURE — 80048 BASIC METABOLIC PNL TOTAL CA: CPT

## 2025-05-16 PROCEDURE — 99213 OFFICE O/P EST LOW 20 MIN: CPT | Performed by: INTERNAL MEDICINE

## 2025-05-16 PROCEDURE — 1159F MED LIST DOCD IN RCRD: CPT | Performed by: INTERNAL MEDICINE

## 2025-05-16 PROCEDURE — 1036F TOBACCO NON-USER: CPT | Performed by: INTERNAL MEDICINE

## 2025-05-16 PROCEDURE — 99213 OFFICE O/P EST LOW 20 MIN: CPT | Mod: 25 | Performed by: INTERNAL MEDICINE

## 2025-05-16 PROCEDURE — 85027 COMPLETE CBC AUTOMATED: CPT

## 2025-05-16 PROCEDURE — 36415 COLL VENOUS BLD VENIPUNCTURE: CPT

## 2025-05-16 RX ORDER — SODIUM CHLORIDE 9 MG/ML
75 INJECTION, SOLUTION INTRAVENOUS CONTINUOUS
OUTPATIENT
Start: 2025-05-16 | End: 2025-05-19

## 2025-05-16 ASSESSMENT — ENCOUNTER SYMPTOMS
OCCASIONAL FEELINGS OF UNSTEADINESS: 0
LOSS OF SENSATION IN FEET: 0
DEPRESSION: 0
ENDOCRINE COMMENTS: NIGHT SWEATS
DYSPNEA ON EXERTION: 1

## 2025-05-16 NOTE — TELEPHONE ENCOUNTER
Pt saw Dr. Romero in office today and stat [facility] was ordered - Dr. Romero requesting Monday 5/19. Pt agreeable to 5/19 8:30am (arrive 7:30am) [facility]. Routing to nursing [place] for instructions call. Routing to [facility] to add to Dr. Romero's calendar.     Handed pt lab orders - he plans to update labs this afternoon.

## 2025-05-16 NOTE — PATIENT INSTRUCTIONS
Continue all current medications as prescribed.  Avoid heavy exertion for now.  Dr. Romero has placed orders for a heart catheterization to evaluate for any new or worsening blockages in your heart arteries. Instructions: Nothing to eat or drink from midnight the night before the procedure. If you take any morning medications, these can be taken with small sips of water; no coffee or tea. Please have someone with you to drive you home as you will receive light sedation and driving is not recommended. Please see the folder provided to you today for further information.   Followup with Dr. Romero after the above procedure.    If you have any questions or cardiac concerns, please call our office at [phone].

## 2025-05-19 ENCOUNTER — HOSPITAL ENCOUNTER (OUTPATIENT)
Facility: HOSPITAL | Age: 75
Setting detail: OUTPATIENT SURGERY
Discharge: HOME | End: 2025-05-19
Attending: INTERNAL MEDICINE | Admitting: INTERNAL MEDICINE
Payer: MEDICARE

## 2025-05-19 ENCOUNTER — APPOINTMENT (OUTPATIENT)
Dept: CARDIOLOGY | Facility: HOSPITAL | Age: 75
End: 2025-05-19
Payer: MEDICARE

## 2025-05-19 VITALS
HEART RATE: 65 BPM | OXYGEN SATURATION: 100 % | DIASTOLIC BLOOD PRESSURE: 77 MMHG | RESPIRATION RATE: 18 BRPM | SYSTOLIC BLOOD PRESSURE: 148 MMHG | TEMPERATURE: 97.1 F

## 2025-05-19 DIAGNOSIS — I25.10 CAD IN NATIVE ARTERY: ICD-10-CM

## 2025-05-19 DIAGNOSIS — I25.9 CHEST PAIN DUE TO MYOCARDIAL ISCHEMIA, UNSPECIFIED ISCHEMIC CHEST PAIN TYPE: Primary | ICD-10-CM

## 2025-05-19 DIAGNOSIS — Z98.61 S/P PTCA (PERCUTANEOUS TRANSLUMINAL CORONARY ANGIOPLASTY): ICD-10-CM

## 2025-05-19 DIAGNOSIS — I25.112 ATHEROSCLEROTIC HEART DISEASE OF NATIVE CORONARY ARTERY WITH REFRACTORY ANGINA PECTORIS: ICD-10-CM

## 2025-05-19 LAB
ACT BLD: 305 SEC (ref 83–199)
ACT BLD: >397 SEC (ref 83–199)
ATRIAL RATE: 60 BPM
ATRIAL RATE: 60 BPM
Q ONSET: 188 MS
Q ONSET: 188 MS
QRS COUNT: 10 BEATS
QRS COUNT: 10 BEATS
QRS DURATION: 192 MS
QRS DURATION: 198 MS
QT INTERVAL: 532 MS
QT INTERVAL: 554 MS
QTC CALCULATION(BAZETT): 532 MS
QTC CALCULATION(BAZETT): 554 MS
QTC FREDERICIA: 532 MS
QTC FREDERICIA: 554 MS
R AXIS: -40 DEGREES
R AXIS: -42 DEGREES
T AXIS: 51 DEGREES
T AXIS: 67 DEGREES
T OFFSET: 454 MS
T OFFSET: 465 MS
VENTRICULAR RATE: 60 BPM
VENTRICULAR RATE: 60 BPM

## 2025-05-19 PROCEDURE — 93010 ELECTROCARDIOGRAM REPORT: CPT | Performed by: INTERNAL MEDICINE

## 2025-05-19 PROCEDURE — C1887 CATHETER, GUIDING: HCPCS | Performed by: INTERNAL MEDICINE

## 2025-05-19 PROCEDURE — 93454 CORONARY ARTERY ANGIO S&I: CPT | Mod: 59 | Performed by: INTERNAL MEDICINE

## 2025-05-19 PROCEDURE — 92978 ENDOLUMINL IVUS OCT C 1ST: CPT | Performed by: INTERNAL MEDICINE

## 2025-05-19 PROCEDURE — 99152 MOD SED SAME PHYS/QHP 5/>YRS: CPT | Performed by: INTERNAL MEDICINE

## 2025-05-19 PROCEDURE — C1769 GUIDE WIRE: HCPCS | Performed by: INTERNAL MEDICINE

## 2025-05-19 PROCEDURE — C1894 INTRO/SHEATH, NON-LASER: HCPCS | Performed by: INTERNAL MEDICINE

## 2025-05-19 PROCEDURE — 2720000007 HC OR 272 NO HCPCS: Performed by: INTERNAL MEDICINE

## 2025-05-19 PROCEDURE — 93005 ELECTROCARDIOGRAM TRACING: CPT

## 2025-05-19 PROCEDURE — C1760 CLOSURE DEV, VASC: HCPCS | Performed by: INTERNAL MEDICINE

## 2025-05-19 PROCEDURE — 85347 COAGULATION TIME ACTIVATED: CPT

## 2025-05-19 PROCEDURE — 2500000004 HC RX 250 GENERAL PHARMACY W/ HCPCS (ALT 636 FOR OP/ED): Performed by: INTERNAL MEDICINE

## 2025-05-19 PROCEDURE — 92920 PRQ TRLUML C ANGIOP 1ART&/BR: CPT | Performed by: INTERNAL MEDICINE

## 2025-05-19 PROCEDURE — 99153 MOD SED SAME PHYS/QHP EA: CPT | Performed by: INTERNAL MEDICINE

## 2025-05-19 PROCEDURE — 2550000001 HC RX 255 CONTRASTS: Mod: JZ | Performed by: INTERNAL MEDICINE

## 2025-05-19 PROCEDURE — 92978 ENDOLUMINL IVUS OCT C 1ST: CPT | Mod: LC | Performed by: INTERNAL MEDICINE

## 2025-05-19 PROCEDURE — 96373 THER/PROPH/DIAG INJ IA: CPT | Mod: 59 | Performed by: INTERNAL MEDICINE

## 2025-05-19 PROCEDURE — C1753 CATH, INTRAVAS ULTRASOUND: HCPCS | Performed by: INTERNAL MEDICINE

## 2025-05-19 PROCEDURE — 93454 CORONARY ARTERY ANGIO S&I: CPT | Performed by: INTERNAL MEDICINE

## 2025-05-19 PROCEDURE — C1725 CATH, TRANSLUMIN NON-LASER: HCPCS | Performed by: INTERNAL MEDICINE

## 2025-05-19 PROCEDURE — 7100000009 HC PHASE TWO TIME - INITIAL BASE CHARGE: Performed by: INTERNAL MEDICINE

## 2025-05-19 PROCEDURE — 7100000010 HC PHASE TWO TIME - EACH INCREMENTAL 1 MINUTE: Performed by: INTERNAL MEDICINE

## 2025-05-19 PROCEDURE — 93005 ELECTROCARDIOGRAM TRACING: CPT | Mod: 59

## 2025-05-19 RX ORDER — HEPARIN SODIUM 1000 [USP'U]/ML
INJECTION, SOLUTION INTRAVENOUS; SUBCUTANEOUS AS NEEDED
Status: DISCONTINUED | OUTPATIENT
Start: 2025-05-19 | End: 2025-05-19 | Stop reason: HOSPADM

## 2025-05-19 RX ORDER — SODIUM CHLORIDE 9 MG/ML
1 INJECTION, SOLUTION INTRAVENOUS CONTINUOUS
Status: DISCONTINUED | OUTPATIENT
Start: 2025-05-19 | End: 2025-05-19 | Stop reason: HOSPADM

## 2025-05-19 RX ORDER — FENTANYL CITRATE 50 UG/ML
INJECTION, SOLUTION INTRAMUSCULAR; INTRAVENOUS AS NEEDED
Status: DISCONTINUED | OUTPATIENT
Start: 2025-05-19 | End: 2025-05-19 | Stop reason: HOSPADM

## 2025-05-19 RX ORDER — SODIUM CHLORIDE 9 MG/ML
75 INJECTION, SOLUTION INTRAVENOUS CONTINUOUS
Status: DISCONTINUED | OUTPATIENT
Start: 2025-05-19 | End: 2025-05-19 | Stop reason: HOSPADM

## 2025-05-19 RX ORDER — MIDAZOLAM HYDROCHLORIDE 1 MG/ML
INJECTION, SOLUTION INTRAMUSCULAR; INTRAVENOUS AS NEEDED
Status: DISCONTINUED | OUTPATIENT
Start: 2025-05-19 | End: 2025-05-19 | Stop reason: HOSPADM

## 2025-05-19 RX ORDER — LIDOCAINE HYDROCHLORIDE 20 MG/ML
INJECTION, SOLUTION INFILTRATION; PERINEURAL AS NEEDED
Status: DISCONTINUED | OUTPATIENT
Start: 2025-05-19 | End: 2025-05-19 | Stop reason: HOSPADM

## 2025-05-19 RX ORDER — SODIUM CHLORIDE 9 MG/ML
1000 INJECTION, SOLUTION INTRAVENOUS ONCE AS NEEDED
Status: DISCONTINUED | OUTPATIENT
Start: 2025-05-19 | End: 2025-05-19 | Stop reason: HOSPADM

## 2025-05-19 RX ADMIN — SODIUM CHLORIDE 75 ML/HR: 9 INJECTION, SOLUTION INTRAVENOUS at 07:45

## 2025-05-19 ASSESSMENT — PAIN SCALES - GENERAL

## 2025-05-19 ASSESSMENT — PAIN - FUNCTIONAL ASSESSMENT
PAIN_FUNCTIONAL_ASSESSMENT: 0-10

## 2025-05-19 NOTE — Clinical Note
Angioplasty of the anomalous circumflex lesion. Inflation 1: Pressure = 16 kalyn; Duration = 8 sec. Inflation 2: Pressure = 20 kalyn; Duration = 10 sec. Inflation 3: Pressure = 20 kalyn; Duration = 10 sec.

## 2025-05-19 NOTE — POST-PROCEDURE NOTE
Physician Transition of Care Summary  Invasive Cardiovascular Lab    Procedure Date: 5/19/2025  Attending:    Dhaval Romero - Primary  Resident/Fellow/Other Assistant: Surgeons and Role:  * No surgeons found with a matching role *    Indications:   Pre-op Diagnosis      * CAD in native artery [I25.10]     * Chest pain due to myocardial ischemia, unspecified ischemic chest pain type [I25.9]    Post-procedure diagnosis:   Post-op Diagnosis     * CAD in native artery [I25.10]     * Chest pain due to myocardial ischemia, unspecified ischemic chest pain type [I25.9]    Procedure(s):   C, With LV  98352 - ID CATH PLMT L HRT & ARTS W/NJX & ANGIO IMG S&I    IVUS - Coronary  [id number] - ID ENDOLUMINAL CORONARY IVUS OCT I&R INITIAL VESSEL    Angioplasty - Coronary  [id number] - ID PRQ TRLUML CORONARY ANGIOPLASTY ONE ART/BRANCH        Procedure Findings:   PTCA for instent restenosis of LCx.   Under expanded stent per IVUS     Description of the Procedure:   LHC  Rt radial>TR band   Successful angioplasty of the ISR of the LCX stent     Complications:   None     Stents/Implants:   Implants       No implant documentation for this case.            Anticoagulation/Antiplatelet Plan:   DAPT for 6 months uninterrupted     Estimated Blood Loss:   5 mL    Anesthesia: Moderate Sedation Anesthesia Staff: No anesthesia staff entered.    Any Specimen(s) Removed:   ACT    Disposition:   Recovery and home today       Electronically signed by: JOAQUIN Raines, 5/19/2025 9:49 AM

## 2025-05-19 NOTE — Clinical Note
Angioplasty of the anomalous circumflex lesion. Inflation 1: Pressure = 10 kalyn; Duration = 20 sec. Inflation 2: Pressure = 10 kalyn; Duration = 12 sec.

## 2025-05-19 NOTE — DISCHARGE INSTRUCTIONS
If you have any questions, please call the Cath Lab Nurse Practitioner Savana Livan at [phone] and leave a message. She will return your call the same day if calling before 3 PM M-F. If you call on the weekend you can expect a call back on Monday morning. You may also call the Cath Lab at [phone] M-F, 7-3:30 with any questions. Weekends and after hours please call your cardiologist office number to reach a physician on call.          CARDIAC CATHETERIZATION DISCHARGE INSTRUCTIONS     FOR SUDDEN AND SEVERE CHEST PAIN, SHORTNESS OF BREATH, EXCESSIVE BLEEDING, SIGNS OF STROKE, OR CHANGES IN MENTAL STATUS YOU SHOULD CALL 911 IMMEDIATELY.     If your provider has prescribed aspirin and/or clopidogrel (Plavix), or prasugrel (Effient), or ticagrelor (Brilinta), DO NOT STOP THESE MEDICATIONS for any reason without talking to your cardiologist first. If any of these were prescribed, you must take them every day without missing a single dose. If you are getting low on these medications, contact your provider immediately for a refill.     FOR NEXT 24 HOURS  - Upon discharge, you should return home and rest for the remainder of the day and evening. You do not have to stay on bed rest but should not be very active.  It is recommended a responsible adult be with you for the first 24 hours after the procedure.    - No driving for 24 hours after procedure. Please arrange for someone to drive you home from the hospital today.     - Do not drive, operate machinery, or use power tools for 24 hours after your procedure.     - Do not make any legal decisions for 24 hours after your procedure.     - Do not drink alcoholic beverages for 24 hours after your procedure.    WOUND CARE   *FOR FEMORAL (LEG) ACCESS*  ·      Avoid heavy lifting (over 10 pounds) for 7 days, squatting or excessive bending for 2 days, and strenuous exercise for 7 days.  ·      No submerged bathing, swimming, or hot tubs for the next 7 days, or until fully  healed.  ·      Avoid sexual activity for 3-4 days until any groin discomfort has ceased.     *FOR RADIAL (WRIST) ACCESS*  ·      No excessive use of the wrist for 24 hours - for example, treat your wrist as if it is sprained.  '      Do not lift anymore than 5 lbs. For 5 days with the same arm as your wrist/access site is on.  ·      Do not engage in vigorous activities (tennis, golf, bowling, weights) for at least 48 hours after the procedure.  ·      Do not submerge the wrist for 7 days after the procedure.  ·      You should expect mild tingling in your hand and tenderness at the puncture site for up to 3 days.    - The transparent dressing should be removed from the site 24 hours after the procedure.  Wash the site gently with soap and water. Rinse well and pat dry. Keep the area clean and dry. You may apply a Band-Aid to the site. Avoid lotions, ointments, or powders until fully healed.     - You may shower the day after your procedure.      - It is normal to notice a small bruise around the puncture site and/or a small grape sized or smaller lump. Any large bruising or large lump warrants a call to the office.     - If bleeding should occur, lay down and apply pressure to the affected area for 10 minutes.  If the bleeding stops notify your physician.  If there is a large amount of bleeding or spurting of blood CALL 911 immediately.  DO NOT drive yourself to the hospital.    - You may experience some tenderness, bruising or minimal inflammation.  If you have any concerns, you may contact the Cath Lab or if any of these symptoms become excessive, contact your cardiologist or go to the emergency room.     OTHER INSTRUCTIONS  - You may take acetaminophen (Tylenol) as directed for discomfort.  If pain is not relieved with acetaminophen (Tylenol), contact your doctor.    - If you notice or experience any of the following, you should notify your doctor or seek medical attention  Chest pain or discomfort  Change in  mental status or weakness in extremities.  Dizziness, light headedness, or feeling faint.  Change in the site where the procedure was performed, such as bleeding or an increased area of bruising or swelling.  Tingling, numbness, pain, or coolness in the leg/arm beyond the site where the procedure was performed.  Signs of infection (i.e. shaking chills, temperature > 100 degrees Fahrenheit, warmth, redness) in the leg/arm area where the procedure was performed.  Changes in urination   Bloody or black stools  Vomiting blood  Severe nose bleeds  Any excessive bleeding    - If you DO NOT have an appointment with your cardiologist within 2-4 weeks following your procedure, please contact their office.      Lifestyle Recommendations for a Healthier Life:    The following lifestyle changes can have a significant positive impact on your overall health - helping you to achieve healthy weight, lower metabolic and heart disease risk and manage stress more effectively:      1. Eat whole, fresh foods. Food is one of the biggest drivers of our overall health.  Make your meals whole foods based, focusing on a wide variety of non starchy vegetables and fruits.  It also beneficial to include various nuts, seeds and high-quality animal proteins for overall balanced diet.    2. Remove the sweeteners from your diet.  Artificial sweeteners have a negative impact on our metabolic health - they can cause increase in both glucose and insulin, increasing the risk or potential for insulin resistance and abnormal blood sugar levels.  Artificial sweeteners are also excessively sweeter than regular sugar, they trick our taste buds into craving extreme forms of sweet, like an addiction.  I encourage you to avoid sugar, but also stevia, aspartame, sucralose, sugar alcohols like xylitol and maltitol.    3. Get rid of the inflammatory factors in your diet - this mainly comes from sugars of all kinds and refined vegetable oils.  These can increase  our risk for changes in our metabolic health which can lead to diabetes, heart disease and other chronic disease.  You can reverse or combat the effective of inflammatory foods by increasing your intake of anti-inflammatory foods like wild-caught fish, fresh ground flax seed, fish oil and variety of fruits & vegetables.      4. Eat plenty of fiber!!  The standard American diet has very low levels of daily dietary fiber, many people barely get 15 grams per day.  There is plenty of nutrition research that shows how high-fiber foods can help lower our blood sugar.   Eat a wide variety of fiber-rich plant-based foods including nuts, seeds, fruits, vegetables, and legumes.    5. Get enough sleep. Studies from experts in endocrinology & metabolism have shown that even a few nights of poor sleep can increase the risk of insulin resistance and abnormal blood sugar values. Make sleep a priority - it is an important factor in your health.  Develop a sleep routine including: avoid eating two-three hours before bed, practice relaxation techniques (warm bath, meditation, yoga, mindfulness) to help relax your muscles and prepare you for sleep.    Go to bed and wake up at consistent times.  Avoid screen time for at least 60-90 minutes before bedtime.    6. Make exercise part of your regular routine.  Exercise helps us manage blood sugar more effectively and makes our cells more receptive to the effect of the insulin our body makes (lowers blood sugar).  Regular aerobic exercise AND interval or strength training are ideal to help maintain a healthy weight, metabolism & lower the risk of chronic disease.      7. Control stress levels. Chronic stress can lead to elevated blood sugar, elevated blood pressure, accumulation of fat around the belly and these things increase the risk for metabolic syndrome, diabetes and heart disease.  We all have various levels of stress in our lives, we can't control all of it, but we can control how we  respond to it and manage it's impact.  Find healthy outlets for stress management like meditation, yoga, deep breathing, or exercise.    Home BP monitoring instructions:   Goal < 130 / 80   Remain still, Avoid smoking, caffeinated beverages, or exercise within 30 min before BP measurements.  Ensure 5 min of quiet rest before BP measurements.  Sit correctly with back straight and supported (on a straight-backed dining chair, for example, rather than a sofa).  Sit with feet flat on the floor and legs uncrossed.  Keep arm supported on a flat surface (such as a table), with the upper arm at heart level.  Bottom of the cuff should be placed directly above the bend of the elbow  Take a reading in the AM before breakfast 2-3 times / week   Record all readings accurately:  A written log should be brought to all appointments   Monitors with built-in memory should be brought to all clinic appointments and calibrated to our machines      Weight Management:  Since food equals calories, in order to lose weight you must either eat fewer calories, exercise more to burn off calories with activity, or both. Food that is not used to fuel the body is stored as fat.    A major component of losing weight is to make smarter food choices. Here's how:    Limit non-nutritious foods, such as:  Sugar, honey, syrups and candy  Pastries, donuts, pies, cakes and cookies  Soft drinks, sweetened juices and alcoholic beverages    Cut down on high-fat foods by:  Choosing poultry, fish or lean red meat  Choosing low-fat cooking methods, such as baking, broiling, steaming, grilling and boiling  Using low-fat or non-fat dairy products  Using vinaigrette, herbs, lemon or fat-free salad dressings  Avoiding fatty meats, such as crabtree, sausage, herrera, ribs and luncheon meats  Avoiding high-fat snacks like nuts, chips and chocolate  Avoiding fried foods  Using less butter, margarine, oil and mayonnaise  Avoiding high-fat gravies, cream sauces and  cream-based soups    Eat a variety of foods, including:  Fruit and vegetables that are raw, steamed or baked  Whole grains, breads, cereal, rice and pasta  Dairy products, such as low-fat or non-fat milk or yogurt, low-fat cottage cheese and low-fat cheese  Protein-rich foods like chicken, turkey, fish, lean meat and legumes, or beans    Change your eating habits:  Eat three balanced meals a day to help control your hunger  Watch portion sizes and eat small servings of a variety of foods  Choose low-calorie snacks  Eat only when you are hungry and stop when you are satisfied  Eat slowly and try not to perform other tasks while eating  Find other activities to distract you from food, such as walking, taking up a hobby or being involved in the community  Include regular exercise in your daily routine  Find a support group, if necessary, for emotional support in your weight loss effort    Patient Education: Smoking Cessation  Making the commitment to quit smoking is one of the best choices that smokers can make for themselves, but also a difficult one. There are various opportunities for support available. Here is an overview of them.  There are various forms of nicotine replacement therapy available to assist with minimizing these symptoms. They are nicotine gum, nicotine patch, nicotine nasal spray, nicotine inhaler, and nicotine lozenge.  Which kind of nicotine replacement therapy will best work for you can be discussed with your doctor or nurse to help you understand the pros and cons of each.  Non-nicotine replacement therapy is also available. Bupropion (Wellbutrin, Zyban) is a mild anti-depressant that is also effective in helping people to quit smoking. It can be used alone or with nicotine replacement therapy.   Varenicline (Chantix) is another medication that helps people who what to quit. This medication is not a form of nicotine replacement therapy, but it helps with the withdrawal symptoms.  Studies have  shown that the best success rates for people trying to quit include counseling and/or support groups such as the National Hel[employer]ine that is a free, confidential, 24/7, 365-day-a-year treatment referral and information service:  3-097-977-GBHL (9019)    Some helpful hints include:  Avoidance. Stay away from smokers and places where you are tempted to smoke.  Activities. Exercise or do hobbies that keep your hands busy.  Alternatives. Use oral substitutes such as sugarless gum, hard candy, and carrot sticks.  Change of habits. For example, if you usually smoke during a coffee break, then go for a walk instead.  Deep breathing. When you have the urge to smoke, do a deep breathing exercise and remind yourself why you quit.  Delay tactic. If you feel that you are about to light up, delay. Tell yourself you must wait 10 minutes. Often this simple trick will allow you to move beyond the urge  Discussion. Call a friend for support.  Drink of water. Use as an oral substitute such as water.  Many people say the reason they smoke is to deal with stress. Unfortunately, stress is a part of all our lives. When quitting, you will need to find new strategies to deal with stress. There are stress-management classes, and self-help books that can help you discover new ways to reduce and deal with stress.  The bottom line is: don't just try to go it alone-reach out for support to help you achieve your goal.

## 2025-05-22 LAB
ANION GAP SERPL CALCULATED.4IONS-SCNC: 13 MMOL/L (CALC) (ref 7–17)
BUN SERPL-MCNC: 17 MG/DL (ref 7–25)
BUN/CREAT SERPL: NORMAL (CALC) (ref 6–22)
CALCIUM SERPL-MCNC: 9.4 MG/DL (ref 8.6–10.3)
CHLORIDE SERPL-SCNC: 102 MMOL/L (ref 98–110)
CO2 SERPL-SCNC: 26 MMOL/L (ref 20–32)
CREAT SERPL-MCNC: 1.09 MG/DL (ref 0.7–1.28)
EGFRCR SERPLBLD CKD-EPI 2021: 70 ML/MIN/1.73M2
ERYTHROCYTE [DISTWIDTH] IN BLOOD BY AUTOMATED COUNT: 13.6 % (ref 11–15)
GLUCOSE SERPL-MCNC: 96 MG/DL (ref 65–99)
HCT VFR BLD AUTO: 42.1 % (ref 38.5–50)
HGB BLD-MCNC: 14 G/DL (ref 13.2–17.1)
MCH RBC QN AUTO: 32.5 PG (ref 27–33)
MCHC RBC AUTO-ENTMCNC: 33.3 G/DL (ref 32–36)
MCV RBC AUTO: 97.7 FL (ref 80–100)
PLATELET # BLD AUTO: 181 THOUSAND/UL (ref 140–400)
PMV BLD REES-ECKER: 11.5 FL (ref 7.5–12.5)
POTASSIUM SERPL-SCNC: 4.2 MMOL/L (ref 3.5–5.3)
RBC # BLD AUTO: 4.31 MILLION/UL (ref 4.2–5.8)
SODIUM SERPL-SCNC: 141 MMOL/L (ref 135–146)
WBC # BLD AUTO: 6 THOUSAND/UL (ref 3.8–10.8)

## 2025-06-02 ENCOUNTER — OFFICE VISIT (OUTPATIENT)
Dept: CARDIOLOGY | Facility: HOSPITAL | Age: 75
End: 2025-06-02
Payer: MEDICARE

## 2025-06-02 VITALS
HEART RATE: 64 BPM | BODY MASS INDEX: 37.94 KG/M2 | HEIGHT: 70 IN | DIASTOLIC BLOOD PRESSURE: 80 MMHG | SYSTOLIC BLOOD PRESSURE: 124 MMHG | WEIGHT: 265 LBS

## 2025-06-02 DIAGNOSIS — I25.10 CAD IN NATIVE ARTERY: ICD-10-CM

## 2025-06-02 PROCEDURE — 3074F SYST BP LT 130 MM HG: CPT | Performed by: INTERNAL MEDICINE

## 2025-06-02 PROCEDURE — 99214 OFFICE O/P EST MOD 30 MIN: CPT | Mod: 25 | Performed by: INTERNAL MEDICINE

## 2025-06-02 PROCEDURE — 99214 OFFICE O/P EST MOD 30 MIN: CPT | Performed by: INTERNAL MEDICINE

## 2025-06-02 PROCEDURE — 1036F TOBACCO NON-USER: CPT | Performed by: INTERNAL MEDICINE

## 2025-06-02 PROCEDURE — 3079F DIAST BP 80-89 MM HG: CPT | Performed by: INTERNAL MEDICINE

## 2025-06-02 PROCEDURE — 1160F RVW MEDS BY RX/DR IN RCRD: CPT | Performed by: INTERNAL MEDICINE

## 2025-06-02 PROCEDURE — 1159F MED LIST DOCD IN RCRD: CPT | Performed by: INTERNAL MEDICINE

## 2025-06-02 PROCEDURE — 93005 ELECTROCARDIOGRAM TRACING: CPT | Performed by: INTERNAL MEDICINE

## 2025-06-02 ASSESSMENT — ENCOUNTER SYMPTOMS
OCCASIONAL FEELINGS OF UNSTEADINESS: 0
DEPRESSION: 0
LOSS OF SENSATION IN FEET: 0

## 2025-06-02 NOTE — PROGRESS NOTES
"Counseling:  The patient was counseled regarding diagnostic results, instructions for management, risk factor reductions, prognosis, patient and family education, impressions, risks and benefits of treatment options and importance of compliance with treatment.      Chief Complaint:   The patient presents today for 2-week followup of unstable angina s/p [facility].      History Of Present Illness:    Garett Stafford is a 75 y.o. male who presents today for 2-week followup of unstable angina s/p [facility]. His PMH is significant for CAD s/p multiple prior PCI with h/o NSTEMI s/p PCI of LAD 11/08/2024, CHB s/p PPM 07/2021, AAA, dyslipidemia, HTN, and aortic regurgitation. LHC performed 05/19/2025 revealed double vessel disease for which he underwent PCI of the Cx. Today, the patient states that he is feeling well, having recovered from PCI. He denies any recurrent SOB. He reports persistent diaphoresis. The patient is attending cardiac rehab. He is compliant with his prescribed medications. EKG today shows a paced rhythm.      Last Recorded Vitals:  Vitals:    06/02/25 1320   BP: 124/80   BP Location: Left arm   Pulse: 64   Weight: 120 kg (265 lb)   Height: 1.778 m (5' 10\")       Past Surgical History:  He has a past surgical history that includes Cardiac surgery; Appendectomy; Tonsillectomy; Joint replacement; Cardiac catheterization (N/A, 11/08/2024); Cardiac catheterization (N/A, 11/08/2024); Cardiac catheterization (N/A, 11/08/2024); Cardiac catheterization (N/A, 11/08/2024); Coronary stent placement; Cardiac catheterization (N/A, 5/19/2025); Cardiac catheterization (N/A, 5/19/2025); and Cardiac catheterization (N/A, 5/19/2025).      Social History:  He reports that he has quit smoking. His smoking use included cigarettes. He has never been exposed to tobacco smoke. He has never used smokeless tobacco. He reports current alcohol use. He reports that he does not use drugs.    Family History:  Family History   Problem Relation " Name Age of Onset    Diabetes Mother          Allergies:  Adhesive tape-silicones, Colestipol, Demerol [meperidine], Lipitor [atorvastatin], Simvastatin, Titanium, Augmentin [amoxicillin-pot clavulanate], Aspirin, Fenofibrate, Magnesium, Niacin, Adhes. band-tape-benzalkonium, and Hydrocodone-acetaminophen    Outpatient Medications:  Current Outpatient Medications   Medication Instructions    acetaminophen (TYLENOL) 650 mg, oral, Every 4 hours PRN    aspirin 81 mg, oral, Daily    B cmplx 4/vit D3/C/folic/zinc (VITAL-D RX ORAL) Take by mouth.    b complex 0.4 mg tablet 1 tablet, Daily    Brilinta 90 mg, oral, 2 times daily    carvedilol (Coreg) 6.25 mg tablet Take 1 tablet (6.25 mg) by mouth 2 times a day.    krill-om-3-dha-epa-phospho-ast (krill oil) 1,292-187-28-80 mg capsule Take by mouth.    levothyroxine (SYNTHROID, LEVOXYL) 50 mcg, Daily    lisinopriL-hydrochlorothiazide 10-12.5 mg tablet 1 tablet, Daily    multivitamin with minerals tablet 1 tablet, Daily    nitroglycerin (NITROSTAT) 0.4 mg, Every 5 min PRN    POTASSIUM ORAL 99 mg, 2 times daily (0900,1400)    Repatha SureClick 140 mg, subcutaneous, Every 14 days     Review of Systems   Cardiovascular:         Diaphoresis   All other systems reviewed and are negative.     Physical Exam:  Constitutional:       Appearance: Healthy appearance. Not in distress.   Neck:      Vascular: No JVR. JVD normal.   Pulmonary:      Effort: Pulmonary effort is normal.      Breath sounds: Normal breath sounds. No wheezing. No rhonchi. No rales.   Chest:      Chest wall: Not tender to palpatation.   Cardiovascular:      PMI at left midclavicular line. Normal rate. Regular rhythm. Normal S1. Normal S2.       Murmurs: There is no murmur.      No gallop.  No click. No rub.   Pulses:     Intact distal pulses.   Edema:     Peripheral edema absent.   Abdominal:      General: Bowel sounds are normal.      Palpations: Abdomen is soft.      Tenderness: There is no abdominal tenderness.    Musculoskeletal: Normal range of motion.         General: No tenderness. Skin:     General: Skin is warm and dry.   Neurological:      General: No focal deficit present.      Mental Status: Alert and oriented to person, place and time.          Last Labs:  CBC -  Lab Results   Component Value Date    WBC 6.0 05/21/2025    HGB 14.0 05/21/2025    HCT 42.1 05/21/2025    MCV 97.7 05/21/2025     05/21/2025       CMP -  Lab Results   Component Value Date    CALCIUM 9.4 05/21/2025    PHOS 2.4 (L) 11/09/2024    PROT 6.4 11/09/2024    ALBUMIN 3.9 11/09/2024    AST 19 11/09/2024    ALT 12 11/09/2024    ALKPHOS 68 11/09/2024    BILITOT 0.6 11/09/2024       LIPID PANEL -   Lab Results   Component Value Date    CHOL 246 (H) 11/08/2024    TRIG 121 11/08/2024    HDL 52.4 11/08/2024    CHHDL 4.7 11/08/2024    VLDL 24 11/08/2024    NHDL 194 (H) 11/08/2024       RENAL FUNCTION PANEL -   Lab Results   Component Value Date    GLUCOSE 96 05/21/2025     05/21/2025    K 4.2 05/21/2025     05/21/2025    CO2 26 05/21/2025    ANIONGAP 13 05/21/2025    BUN 17 05/21/2025    CREATININE 1.09 05/21/2025    CALCIUM 9.4 05/21/2025    PHOS 2.4 (L) 11/09/2024    ALBUMIN 3.9 11/09/2024        Lab Results   Component Value Date    BNP 29 11/08/2024    HGBA1C 6.0 (H) 11/08/2024       Last Cardiology Tests:  05/19/2025 - Cardiac Catheterization (LH)  1. Double vessel disease.  2. Successful PCI of Cx.    11/08/2024 - Cardiac Catheterization (LH)  1. Double vessel disease.  2. Severe proximal LAD stenosis - successfully treated with OCT guided PCI of the LAD.  3.  of the RCA with mature collaterals from the left.  4. High dose statin therapy.  5. DAPT for 12 months.  6. To consider PCI of the  if symptoms persist.    11/08/2024 - TTE  1. Poorly visualized anatomical structures due to suboptimal image quality.  2. The left ventricular systolic function is low normal, with a visually estimated ejection fraction of 50-55%.  3.  Entire anterior septum, apical lateral segment, and apex are abnormal.  4. Left ventricular diastolic filling was indeterminate.  5. Left ventricular cavity size is mildly dilated.  6. There is normal right ventricular global systolic function.  7. Moderately elevated right ventricular systolic pressure.  8. Aortic valve stenosis is not present.  9. Mild aortic valve regurgitation.  10. There is moderately increased septal thickness.    10/30/2024 - CTA Chest/Abdomen/Pelvis  CHEST:  1.  No dissection or aneurysm of the thoracic aorta. No pulmonary emboli.  2. There is a small area of scarring or fibrosis at the base of the left lower lobe with stable 4 mm pulmonary nodule in right middle lobe.  ABDOMEN-PELVIS:  1.  There is fusiform aneurysm of the mid and distal abdominal aorta with distal abdominal aortic aneurysm measuring 4.6 cm in transverse dimension and 5.0 cm in AP dimension. Previously, the aneurysm measured 4.2 x 4.8 cm in size.  2. Mild prostatic enlargement.  3. Atrophic pancreas.    07/25/2021 - Electrophysiology Procedure  Successful fluoroscopy guidance for confirmation of Temporary Pacemaker.     07/22/2021 - TTE  1. The left ventricular systolic function is mildly decreased with a 45-50% estimated ejection fraction.  2. Moderately enlarged right ventricle.  3. There is moderately reduced right ventricular systolic function.  4. The left atrium is moderately dilated.  5. There is global hypokinesis of the left ventricle with minor regional variations.     Lab review: I have personally reviewed the laboratory result(s).  Diagnostic review: I have personally reviewed the result(s) of the [facility].     Assessment/Plan   1) CAD s/p Multiple Prior PCI - NSTEMI s/p PCI of LAD 11/2024 - Now with Symptoms of Unstable Angina s/p PCI of Cx 05/19/2025  On DAPT - ASA 81 mg daily, Brilinta 90 mg BID  On carvedilol 6.25 mg BID, lisinopril-HCTZ 10-12.5 mg daily  Allergy listed to atorvastatin and simvastatin  Hospital  "admission 11/08/2024 to 11/09/2024 with NSTEMI  TTE with LVEF 50-55%, normal RV systolic function, moderately elevated RVSP  [facility] with double vessel disease, severe proximal LAD stenosis,  of RCA with mature collaterals from the left s/p PCI of LAD  [facility] 05/19/2025 with double vessel disease s/p PCI of Cx  Recovered well  Denies recurrent SOB  Reports persistent diaphoresis   Attending cardiac rehab   Continue current medical Rx   F/U 2 months      2) Dyslipidemia  Goal LDL <70  [facility] P[facility]  Allergy listed to atorvastatin and simvastatin - \"unable to use left arm\"  Lipid panel 11/08/2024 with LDL of 169   Continue current medical Rx      3) CHB s/p [employer] PPM July 2021   Device is plastic coated d/t metal allergies, including titanium   [facility]     4) HTN   Stable  On carvedilol 6.25 mg BID, lisinopril-HCTZ 10-12.5 mg daily   Continue current medical Rx   F/U 2 months     5) Aortic Regurgitation  TTE 11/08/2024 with mild aortic regurgitation   BP stable      6) Abdominal Aortic Aneurysm  Management per [facility]  CTA chest/abdomen/pelvis 10/30/2024 with fusiform aneurysm of mid and distal abdominal aorta with distal abdominal aortic aneurysm measuring 4.6 cm in transverse dimension and 5.0 cm in AP dimension; previously, aneurysm measured 4.2 x 4.8 cm in size.        Scribe Attestation  By signing my name below, I, Jeffry Maldonado, attest that this documentation has been prepared under the direction and in the presence of Dami Romero MD.   "

## 2025-06-02 NOTE — PATIENT INSTRUCTIONS
Continue all current medications as prescribed.  Continue to attend cardiac rehabilitation.   Followup with Dr. Romero in 2 months.    If you have any questions or cardiac concerns, please call our office at [phone].

## 2025-06-05 LAB
ATRIAL RATE: 64 BPM
P AXIS: 17 DEGREES
P OFFSET: 152 MS
P ONSET: 104 MS
PR INTERVAL: 182 MS
Q ONSET: 195 MS
QRS COUNT: 10 BEATS
QRS DURATION: 194 MS
QT INTERVAL: 486 MS
QTC CALCULATION(BAZETT): 501 MS
QTC FREDERICIA: 496 MS
R AXIS: -82 DEGREES
T AXIS: 30 DEGREES
T OFFSET: 438 MS
VENTRICULAR RATE: 64 BPM

## 2025-06-17 ENCOUNTER — APPOINTMENT (OUTPATIENT)
Dept: CARDIAC REHAB | Facility: HOSPITAL | Age: 75
End: 2025-06-17

## 2025-06-17 PROCEDURE — 9430000001 HC PHASE III CARDIAC REHAB MONTHLY FEE

## 2025-07-08 ENCOUNTER — APPOINTMENT (OUTPATIENT)
Dept: CARDIAC REHAB | Facility: HOSPITAL | Age: 75
End: 2025-07-08

## 2025-07-08 PROCEDURE — 9430000001 HC PHASE III CARDIAC REHAB MONTHLY FEE

## 2025-08-11 ENCOUNTER — APPOINTMENT (OUTPATIENT)
Dept: CARDIAC REHAB | Facility: HOSPITAL | Age: 75
End: 2025-08-11

## 2025-08-11 ENCOUNTER — APPOINTMENT (OUTPATIENT)
Dept: CARDIOLOGY | Facility: HOSPITAL | Age: 75
End: 2025-08-11
Payer: MEDICARE

## (undated) DEVICE — SHEATH, GLIDESHEATH, SLENDER, 6FR 10CM

## (undated) DEVICE — DEVICE, INFLATION, PRESTO ID40ATM 30CC

## (undated) DEVICE — TR BAND, RADIAL COMPRESSION, STANDARD, 24CM

## (undated) DEVICE — CATHETER, BALLOON, MONORAIL, NC EMERGE, PTCA, 12MM X 4.00MM

## (undated) DEVICE — Device

## (undated) DEVICE — CATHETER, DILATATION, CORONARY, MINI TREK, 2.5MM X 15MM

## (undated) DEVICE — GUIDEWIRE, UNIVERSAL BALANCE MID WEIGHT

## (undated) DEVICE — CATHETER, DRAGONFLY, OPSTAR

## (undated) DEVICE — CATHETER, OPTITORQUE, 6FR, JACKY, BL3.5/2H/100CM

## (undated) DEVICE — GUIDEWIRE, INQUIRE, J TIP, .035 X 210CM, FIXED CORE, DIAGNOSTIC

## (undated) DEVICE — CATHETER, GUIDING LAUNCHER 6FR EBU 3.75 LEFT

## (undated) DEVICE — CATHETER, GUIDING, LAUNCHER, 6 FR, MP 1

## (undated) DEVICE — 6FR DXTERITY MPA DIAGNOSTIC CATHETER, .038 100 CM RADIAL

## (undated) DEVICE — CATHETER, BALLOON, MONORAIL, NC EMERGE, PTCA, 15MM X 3.00MM

## (undated) DEVICE — GUIDEWIRE, PRESSURE WIRE X , 175CM WIRELESS, AGILE TIP

## (undated) DEVICE — CATHETER, EAGLE EYE, PLATNIUM (IVUS)